# Patient Record
Sex: FEMALE | Race: WHITE
[De-identification: names, ages, dates, MRNs, and addresses within clinical notes are randomized per-mention and may not be internally consistent; named-entity substitution may affect disease eponyms.]

---

## 2018-04-25 ENCOUNTER — HOSPITAL ENCOUNTER (INPATIENT)
Dept: HOSPITAL 63 - GEROPSY | Age: 63
LOS: 15 days | Discharge: NURSING, REHAB OR LONG-TERM CARE | DRG: 885 | End: 2018-05-10
Attending: PSYCHIATRY & NEUROLOGY | Admitting: PSYCHIATRY & NEUROLOGY
Payer: MEDICAID

## 2018-04-25 VITALS — DIASTOLIC BLOOD PRESSURE: 74 MMHG | SYSTOLIC BLOOD PRESSURE: 107 MMHG

## 2018-04-25 VITALS — BODY MASS INDEX: 31.18 KG/M2 | HEIGHT: 69 IN | WEIGHT: 210.5 LBS

## 2018-04-25 VITALS — DIASTOLIC BLOOD PRESSURE: 84 MMHG | SYSTOLIC BLOOD PRESSURE: 154 MMHG

## 2018-04-25 DIAGNOSIS — F25.0: Primary | ICD-10-CM

## 2018-04-25 DIAGNOSIS — I10: ICD-10-CM

## 2018-04-25 DIAGNOSIS — Z88.0: ICD-10-CM

## 2018-04-25 DIAGNOSIS — F41.9: ICD-10-CM

## 2018-04-25 DIAGNOSIS — Z91.14: ICD-10-CM

## 2018-04-25 DIAGNOSIS — Z79.899: ICD-10-CM

## 2018-04-25 DIAGNOSIS — H40.9: ICD-10-CM

## 2018-04-25 DIAGNOSIS — Z91.83: ICD-10-CM

## 2018-04-25 DIAGNOSIS — E78.5: ICD-10-CM

## 2018-04-25 DIAGNOSIS — E78.1: ICD-10-CM

## 2018-04-25 DIAGNOSIS — G47.00: ICD-10-CM

## 2018-04-25 DIAGNOSIS — F09: ICD-10-CM

## 2018-04-25 DIAGNOSIS — Z88.1: ICD-10-CM

## 2018-04-25 DIAGNOSIS — F01.50: ICD-10-CM

## 2018-04-25 DIAGNOSIS — G30.9: ICD-10-CM

## 2018-04-25 DIAGNOSIS — E11.9: ICD-10-CM

## 2018-04-25 DIAGNOSIS — F02.81: ICD-10-CM

## 2018-04-25 DIAGNOSIS — F63.9: ICD-10-CM

## 2018-04-25 DIAGNOSIS — M19.90: ICD-10-CM

## 2018-04-25 LAB
ALBUMIN SERPL-MCNC: 3.7 G/DL (ref 3.4–5)
ALBUMIN/GLOB SERPL: 0.9 {RATIO} (ref 1–1.7)
ALP SERPL-CCNC: 66 U/L (ref 46–116)
ALT SERPL-CCNC: 21 U/L (ref 14–59)
ANION GAP SERPL CALC-SCNC: 10 MMOL/L (ref 6–14)
AST SERPL-CCNC: 12 U/L (ref 15–37)
BASOPHILS # BLD AUTO: 0.1 X10^3/UL (ref 0–0.2)
BASOPHILS NFR BLD: 1 % (ref 0–3)
BILIRUB SERPL-MCNC: 0.3 MG/DL (ref 0.2–1)
BUN/CREAT SERPL: 20 (ref 6–20)
CA-I SERPL ISE-MCNC: 16 MG/DL (ref 7–20)
CALCIUM SERPL-MCNC: 8.9 MG/DL (ref 8.5–10.1)
CHLORIDE SERPL-SCNC: 107 MMOL/L (ref 98–107)
CO2 SERPL-SCNC: 26 MMOL/L (ref 21–32)
CREAT SERPL-MCNC: 0.8 MG/DL (ref 0.6–1)
EOSINOPHIL NFR BLD: 0.1 X10^3/UL (ref 0–0.7)
EOSINOPHIL NFR BLD: 1 % (ref 0–3)
ERYTHROCYTE [DISTWIDTH] IN BLOOD BY AUTOMATED COUNT: 15 % (ref 11.5–14.5)
GFR SERPLBLD BASED ON 1.73 SQ M-ARVRAT: 72.4 ML/MIN
GLOBULIN SER-MCNC: 3.9 G/DL (ref 2.2–3.8)
GLUCOSE SERPL-MCNC: 97 MG/DL (ref 70–99)
HCT VFR BLD CALC: 40.1 % (ref 36–47)
HGB BLD-MCNC: 13.3 G/DL (ref 12–15.5)
LYMPHOCYTES # BLD: 2.2 X10^3/UL (ref 1–4.8)
LYMPHOCYTES NFR BLD AUTO: 30 % (ref 24–48)
MAGNESIUM SERPL-MCNC: 1.8 MG/DL (ref 1.8–2.4)
MCH RBC QN AUTO: 28 PG (ref 25–35)
MCHC RBC AUTO-ENTMCNC: 33 G/DL (ref 31–37)
MCV RBC AUTO: 84 FL (ref 79–100)
MONO #: 0.8 X10^3/UL (ref 0–1.1)
MONOCYTES NFR BLD: 12 % (ref 0–9)
NEUT #: 4 X10^3UL (ref 1.8–7.7)
NEUTROPHILS NFR BLD AUTO: 56 % (ref 31–73)
PLATELET # BLD AUTO: 270 X10^3/UL (ref 140–400)
POTASSIUM SERPL-SCNC: 4 MMOL/L (ref 3.5–5.1)
PROT SERPL-MCNC: 7.6 G/DL (ref 6.4–8.2)
RBC # BLD AUTO: 4.77 X10^6/UL (ref 3.5–5.4)
SODIUM SERPL-SCNC: 143 MMOL/L (ref 136–145)
WBC # BLD AUTO: 7.2 X10^3/UL (ref 4–11)

## 2018-04-25 PROCEDURE — 85025 COMPLETE CBC W/AUTO DIFF WBC: CPT

## 2018-04-25 PROCEDURE — 82306 VITAMIN D 25 HYDROXY: CPT

## 2018-04-25 PROCEDURE — 86593 SYPHILIS TEST NON-TREP QUANT: CPT

## 2018-04-25 PROCEDURE — 83036 HEMOGLOBIN GLYCOSYLATED A1C: CPT

## 2018-04-25 PROCEDURE — 83540 ASSAY OF IRON: CPT

## 2018-04-25 PROCEDURE — 80061 LIPID PANEL: CPT

## 2018-04-25 PROCEDURE — 83735 ASSAY OF MAGNESIUM: CPT

## 2018-04-25 PROCEDURE — 82947 ASSAY GLUCOSE BLOOD QUANT: CPT

## 2018-04-25 PROCEDURE — 82607 VITAMIN B-12: CPT

## 2018-04-25 PROCEDURE — 84443 ASSAY THYROID STIM HORMONE: CPT

## 2018-04-25 PROCEDURE — 81001 URINALYSIS AUTO W/SCOPE: CPT

## 2018-04-25 PROCEDURE — 84480 ASSAY TRIIODOTHYRONINE (T3): CPT

## 2018-04-25 PROCEDURE — 36415 COLL VENOUS BLD VENIPUNCTURE: CPT

## 2018-04-25 PROCEDURE — 83550 IRON BINDING TEST: CPT

## 2018-04-25 PROCEDURE — 87086 URINE CULTURE/COLONY COUNT: CPT

## 2018-04-25 PROCEDURE — 84436 ASSAY OF TOTAL THYROXINE: CPT

## 2018-04-25 PROCEDURE — 80053 COMPREHEN METABOLIC PANEL: CPT

## 2018-04-25 PROCEDURE — 80164 ASSAY DIPROPYLACETIC ACD TOT: CPT

## 2018-04-25 RX ADMIN — TIMOLOL MALEATE SCH DROP: 5 SOLUTION/ DROPS OPHTHALMIC at 09:00

## 2018-04-25 RX ADMIN — Medication SCH MG: at 10:50

## 2018-04-25 RX ADMIN — POTASSIUM CHLORIDE SCH MEQ: 750 TABLET, EXTENDED RELEASE ORAL at 10:50

## 2018-04-25 RX ADMIN — HYDROCODONE BITARTRATE AND ACETAMINOPHEN PRN TAB: 5; 325 TABLET ORAL at 22:57

## 2018-04-25 RX ADMIN — LINAGLIPTIN SCH MG: 5 TABLET, FILM COATED ORAL at 06:55

## 2018-04-25 RX ADMIN — POTASSIUM CHLORIDE SCH MEQ: 750 TABLET, EXTENDED RELEASE ORAL at 10:54

## 2018-04-25 RX ADMIN — POTASSIUM CHLORIDE SCH MEQ: 750 TABLET, EXTENDED RELEASE ORAL at 17:00

## 2018-04-25 RX ADMIN — GABAPENTIN SCH MG: 300 CAPSULE ORAL at 10:53

## 2018-04-25 RX ADMIN — LINAGLIPTIN SCH MG: 5 TABLET, FILM COATED ORAL at 06:00

## 2018-04-25 RX ADMIN — OXYBUTYNIN CHLORIDE SCH MG: 5 TABLET ORAL at 10:50

## 2018-04-25 RX ADMIN — OXYBUTYNIN CHLORIDE SCH MG: 5 TABLET ORAL at 21:00

## 2018-04-25 RX ADMIN — Medication SCH MG: at 10:53

## 2018-04-25 RX ADMIN — Medication SCH MG: at 18:00

## 2018-04-25 RX ADMIN — GABAPENTIN SCH MG: 300 CAPSULE ORAL at 10:50

## 2018-04-25 RX ADMIN — BENZTROPINE MESYLATE SCH MG: 1 TABLET ORAL at 11:28

## 2018-04-25 RX ADMIN — ATORVASTATIN CALCIUM SCH MG: 10 TABLET, FILM COATED ORAL at 21:00

## 2018-04-25 RX ADMIN — BENZTROPINE MESYLATE SCH MG: 1 TABLET ORAL at 10:53

## 2018-04-25 RX ADMIN — OXYBUTYNIN CHLORIDE SCH MG: 5 TABLET ORAL at 10:53
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
## 2018-04-25 NOTE — PDOC
Exam


Note:


Javier Note:


Please also refer to the separate dictated note~for this date of service 

dictated separately.~Patient seen individually. Discussed the patient with 

Nursing staff reviewed the chart.~Reviewed interim history and current 

functioning. Reviewed vital signs,~Labs/ Radiology~and current medications 

noted below. Continue current treatment with the changes noted in the dictated 

addendum note





Assessment:


Vital Signs:





 Vital Signs








  Date Time  Temp Pulse Resp B/P (MAP) Pulse Ox O2 Delivery O2 Flow Rate FiO2


 


4/25/18 15:50 98.2 86 22 107/74 (85) 97   








Labs:





 Laboratory Tests








Test


  4/25/18


15:30 4/25/18


16:38


 


White Blood Count


  7.2 x10^3/uL


(4.0-11.0) 


 


 


Red Blood Count


  4.77 x10^6/uL


(3.50-5.40) 


 


 


Hemoglobin


  13.3 g/dL


(12.0-15.5) 


 


 


Hematocrit


  40.1 %


(36.0-47.0) 


 


 


Mean Corpuscular Volume


  84 fL ()


  


 


 


Mean Corpuscular Hemoglobin 28 pg (25-35)   


 


Mean Corpuscular Hemoglobin


Concent 33 g/dL


(31-37) 


 


 


Red Cell Distribution Width


  15.0 %


(11.5-14.5)  H 


 


 


Platelet Count


  270 x10^3/uL


(140-400) 


 


 


Neutrophils (%) (Auto) 56 % (31-73)   


 


Lymphocytes (%) (Auto) 30 % (24-48)   


 


Monocytes (%) (Auto) 12 % (0-9)  H 


 


Eosinophils (%) (Auto) 1 % (0-3)   


 


Basophils (%) (Auto) 1 % (0-3)   


 


Neutrophils # (Auto)


  4.0 x10^3uL


(1.8-7.7) 


 


 


Lymphocytes # (Auto)


  2.2 x10^3/uL


(1.0-4.8) 


 


 


Monocytes # (Auto)


  0.8 x10^3/uL


(0.0-1.1) 


 


 


Eosinophils # (Auto)


  0.1 x10^3/uL


(0.0-0.7) 


 


 


Basophils # (Auto)


  0.1 x10^3/uL


(0.0-0.2) 


 


 


Sodium Level


  143 mmol/L


(136-145) 


 


 


Potassium Level


  4.0 mmol/L


(3.5-5.1) 


 


 


Chloride Level


  107 mmol/L


() 


 


 


Carbon Dioxide Level


  26 mmol/L


(21-32) 


 


 


Anion Gap 10 (6-14)   


 


Blood Urea Nitrogen


  16 mg/dL


(7-20) 


 


 


Creatinine


  0.8 mg/dL


(0.6-1.0) 


 


 


Estimated GFR


(Cockcroft-Gault) 72.4  


  


 


 


BUN/Creatinine Ratio 20 (6-20)   


 


Glucose Level


  97 mg/dL


(70-99) 


 


 


Calcium Level


  8.9 mg/dL


(8.5-10.1) 


 


 


Magnesium Level


  1.8 mg/dL


(1.8-2.4) 


 


 


Total Bilirubin


  0.3 mg/dL


(0.2-1.0) 


 


 


Aspartate Amino Transferase


(AST) 12 U/L (15-37)


L 


 


 


Alanine Aminotransferase (ALT)


  21 U/L (14-59)


  


 


 


Alkaline Phosphatase


  66 U/L


() 


 


 


Total Protein


  7.6 g/dL


(6.4-8.2) 


 


 


Albumin


  3.7 g/dL


(3.4-5.0) 


 


 


Albumin/Globulin Ratio


  0.9 (1.0-1.7)


L 


 


 


Glucose (Fingerstick)


  


  105 mg/dL


(70-99)  H











Current Medications:


Meds:





Current Medications


Acetaminophen (Tylenol) 650 mg PRN Q6HRS  PRN PO PAIN / TEMP;  Start 4/25/18 at 

05:00


Multi-Ingredient Ointment (Analgesic Balm) 1 edin PRN QID  PRN TP MUSCLE PAIN;  

Start 4/25/18 at 05:00


Al Hydroxide/Mg Hydroxide (Mylanta Plus Xs) 15 ml PRN AFTMEALHC  PRN PO 

DYSPEPSIA;  Start 4/25/18 at 05:00


Magnesium Hydroxide (Milk Of Magnesia) 2,400 mg PRN QHS  PRN PO CONSTIPATION;  

Start 4/25/18 at 05:00


Acetaminophen (Tylenol) 650 mg PRN Q4HRS  PRN PO PAIN / TEMP;  Start 4/25/18 at 

05:30


Amlodipine Besylate (Norvasc) 5 mg DAILY PO ;  Start 4/25/18 at 09:00


Atorvastatin Calcium (Lipitor) 10 mg QHS PO ;  Start 4/25/18 at 21:00


Bismuth Subsalicylate (Pepto-Bismol) 262 mg PRN Q4HRS  PRN PO DYSPEPSIA;  Start 

4/25/18 at 05:30


Ferrous Sulfate (Feosol) 325 mg BIDAFTMEAL PO ;  Start 4/25/18 at 09:00


Gabapentin (Neurontin) 300 mg DAILY PO ;  Start 4/25/18 at 09:00


Acetaminophen/ Hydrocodone Bitart (Lortab 5/325) 1 tab TID  PRN PO PAIN;  Start 

4/25/18 at 05:30


Al Hydroxide/Mg Hydroxide (Mylanta Plus Xs) 30 ml PRN Q4HRS  PRN PO DYSPEPSIA;  

Start 4/25/18 at 05:30


Timolol Maleate (Timoptic 0.5% Ophth) 1 drop DAILY OU ;  Start 4/25/18 at 09:00


Vitamin D (Vitamin D3) 50,000 unit WEEKLY PO ;  Start 4/27/18 at 09:00


Hydrochlorothiazide (Hydrodiuril) 25 mg DAILY PO ;  Start 4/25/18 at 09:00


Loperamide HCl (Imodium) 2 mg PRN Q2HR  PRN PO DIARRHEA;  Start 4/25/18 at 05:30


Magnesium Hydroxide (Milk Of Magnesia) 2,400 mg PRN DAILY  PRN PO CONSTIPATION;

  Start 4/25/18 at 05:30


Metformin HCl (Glucophage) 1,000 mg BIDWMEALS PO ;  Start 4/25/18 at 08:00


Potassium Chloride (Klor-Con) 10 meq BIDWMEALS PO ;  Start 4/25/18 at 08:00


Linagliptin (Tradjenta) 5 mg DAILY06 PO ;  Start 4/25/18 at 06:00


Non-Formulary Medication (Solifenacin Succinate (Vesicare)) 5 mg DAILY PO ;  

Start 4/25/18 at 09:00;  Stop 4/25/18 at 09:00;  Status DC


Oxybutynin Chloride (Ditropan) 5 mg BID PO ;  Start 4/25/18 at 09:00


Benztropine Mesylate (Cogentin) 1 mg DAILY PO ;  Start 4/25/18 at 11:00


Olanzapine (ZyPREXA ZYDIS) 2.5 mg PRN Q2HR  PRN PO PSYCHOSIS Last administered 

on 4/25/18at 11:04;  Start 4/25/18 at 10:45;  Stop 4/25/18 at 11:26;  Status DC


Olanzapine (ZyPREXA ZYDIS) 5 mg PRN Q2HR  PRN PO PSYCHOSIS Last administered on 

4/25/18at 15:30;  Start 4/25/18 at 11:30


Lorazepam (Ativan) 0.5 mg PRN Q2HR  PRN PO ANXIETY / AGITATION Last 

administered on 4/25/18at 16:56;  Start 4/25/18 at 11:30


Valproic Acid (Depakene) 500 mg QHS PO ;  Start 4/25/18 at 21:00





Active Scripts


Active


Reported


Benztropine Mesylate 1 Mg Tablet 1 Mg PO DAILY


Atorvastatin Calcium 10 Mg Tablet 10 Mg PO QHS


Amlodipine Besylate 5 Mg Tablet 5 Mg PO DAILY


Januvia (Sitagliptin Phosphate) 100 Mg Tablet 100 Mg PO DAILY06


Potassium Chloride 10 Meq Tablet.er 10 Meq PO BID


Metformin Hcl 1,000 Mg Tablet 1,000 Mg PO BID


Hydrocodone-Apap 5-325  ** (Hydrocodone Bit/Acetaminophen) 1 Each Tablet 1 Tab 

PO TID PRN


Hydrochlorothiazide Tablet (Hydrochlorothiazide) 12.5 Mg Tablet 25 Mg PO DAILY


Gabapentin 300 Mg Capsule 300 Mg PO DAILY


Ferrous Sulfate 325 Mg Tablet 325 Mg PO BID


Vitamin D2 (Ergocalciferol (Vitamin D2)) 50,000 Unit Capsule 50,000 Unit PO QFR


Vesicare (Solifenacin Succinate) 5 Mg Tablet 5 Mg PO DAILY


Timoptic (Timolol Maleate) 10 Ml Drops 1 Ml OU DAILY


Milk Of Magnesia (Magnesium Hydroxide) 2,400 Mg/10 Ml Oral.susp 2,400 Mg PO PRN 

DAILY PRN


Maalox Maximum Strength Susp (Mag Hydrox/Al Hydrox/Simeth) 355 Ml Oral.susp 30 

Ml PO PRN Q4HRS PRN


Loperamide (Loperamide Hcl) 2 Mg Tablet 2 Mg PO PRN Q2HR PRN


Bismatrol (Bismuth Subsalicylate) 262 Mg/15 Ml Oral.susp 262 Mg PO PRN Q4HRS PRN


Tylenol (Acetaminophen) 325 Mg Tablet 650 Mg PO PRN Q4HRS PRN


Zyprexa Zydis (Olanzapine) 5 Mg Tab.rapdis 2.5 Mg PO PRN Q2HR PRN


Haldol Decanoate 100 (Haloperidol Decanoate) 100 Mg/1 Ml Ampul 50 Mg IM [id number]


Invega Sustenna (Paliperidone Palmitate) 234 Mg/1.5 Ml Disp.syrin 234 Mg IM 

QMONTH


I have reviewed the current psychotropics carefully including drug 

interactions.  Risk benefit ratio favors no change other than as noted in my 

dictated progress note.





Diagnosis:


Problems:  


(1) Anxiety disorder


(2) Bipolar affective, mixed, sev w/ psych


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


(5) Dementia, vascular, with delusions


(6) Impulse control disorder


(7) Schizoaffective disorder, chronic condition with acute exacerbation


(8) Schizoaffective disorder











MARYAM HOYT MD Apr 25, 2018 20:29

## 2018-04-26 VITALS — DIASTOLIC BLOOD PRESSURE: 87 MMHG | SYSTOLIC BLOOD PRESSURE: 155 MMHG

## 2018-04-26 VITALS — DIASTOLIC BLOOD PRESSURE: 57 MMHG | SYSTOLIC BLOOD PRESSURE: 87 MMHG

## 2018-04-26 LAB
APTT PPP: YELLOW S
BACTERIA #/AREA URNS HPF: (no result) /HPF
BILIRUB UR QL STRIP: (no result)
CHOLEST SERPL-MCNC: 167 MG/DL (ref 0–200)
CHOLEST/HDLC SERPL: 4 {RATIO}
FIBRINOGEN PPP-MCNC: (no result) MG/DL
GLUCOSE UR STRIP-MCNC: (no result) MG/DL
HBA1C MFR BLD: 6.3 % (ref 4.8–5.6)
HDLC SERPL-MCNC: 38 MG/DL (ref 40–60)
HYALINE CASTS #/AREA URNS LPF: (no result) /HPF
LDLC: 98 MG/DL (ref 0–100)
NITRITE UR QL STRIP: (no result)
RBC #/AREA URNS HPF: 0 /HPF (ref 0–2)
SP GR UR STRIP: 1.02
SQUAMOUS #/AREA URNS LPF: (no result) /LPF
T3 SERPL-MCNC: 90 NG/DL (ref 71–180)
T4 SERPL-MCNC: 8 UG/DL (ref 4.5–12)
THYROID STIM HORMONE (TSH): 1.21 UIU/ML (ref 0.36–3.74)
TRIGL SERPL-MCNC: 158 MG/DL (ref 0–150)
UROBILINOGEN UR-MCNC: 0.2 MG/DL
VLDLC: 31 MG/DL (ref 0–40)
WBC #/AREA URNS HPF: >40 /HPF (ref 0–4)

## 2018-04-26 RX ADMIN — GABAPENTIN SCH MG: 300 CAPSULE ORAL at 10:56

## 2018-04-26 RX ADMIN — BENZTROPINE MESYLATE SCH MG: 1 TABLET ORAL at 10:56

## 2018-04-26 RX ADMIN — HALOPERIDOL LACTATE SCH MG: 5 INJECTION, SOLUTION INTRAMUSCULAR at 03:30

## 2018-04-26 RX ADMIN — POTASSIUM CHLORIDE SCH MEQ: 750 TABLET, EXTENDED RELEASE ORAL at 10:56

## 2018-04-26 RX ADMIN — VALPROIC ACID SCH MG: 250 SOLUTION ORAL at 21:25

## 2018-04-26 RX ADMIN — OXYBUTYNIN CHLORIDE SCH MG: 5 TABLET ORAL at 21:00

## 2018-04-26 RX ADMIN — Medication SCH MG: at 17:20

## 2018-04-26 RX ADMIN — Medication SCH MG: at 10:56

## 2018-04-26 RX ADMIN — ATORVASTATIN CALCIUM SCH MG: 10 TABLET, FILM COATED ORAL at 19:41

## 2018-04-26 RX ADMIN — POTASSIUM CHLORIDE SCH MEQ: 750 TABLET, EXTENDED RELEASE ORAL at 17:20

## 2018-04-26 RX ADMIN — HALOPERIDOL LACTATE SCH MG: 5 INJECTION, SOLUTION INTRAMUSCULAR at 14:44

## 2018-04-26 RX ADMIN — OXYBUTYNIN CHLORIDE SCH MG: 5 TABLET ORAL at 19:41

## 2018-04-26 RX ADMIN — OXYBUTYNIN CHLORIDE SCH MG: 5 TABLET ORAL at 21:39

## 2018-04-26 RX ADMIN — TIMOLOL MALEATE SCH DROP: 5 SOLUTION/ DROPS OPHTHALMIC at 10:58

## 2018-04-26 RX ADMIN — VALPROIC ACID SCH MG: 250 SOLUTION ORAL at 19:42

## 2018-04-26 RX ADMIN — ATORVASTATIN CALCIUM SCH MG: 10 TABLET, FILM COATED ORAL at 21:00

## 2018-04-26 RX ADMIN — LINAGLIPTIN SCH MG: 5 TABLET, FILM COATED ORAL at 05:16

## 2018-04-26 RX ADMIN — OXYBUTYNIN CHLORIDE SCH MG: 5 TABLET ORAL at 10:58

## 2018-04-26 RX ADMIN — ATORVASTATIN CALCIUM SCH MG: 10 TABLET, FILM COATED ORAL at 21:39

## 2018-04-26 NOTE — PDOC
Exam


Note:


Javier Note:


Please also refer to the separate dictated note~for this date of service 

dictated separately.~Patient seen individually. Discussed the patient with 

Nursing staff reviewed the chart.~Reviewed interim history and current 

functioning. Reviewed vital signs,~Labs/ Radiology~and current medications 

noted below. Continue current treatment with the changes noted in the dictated 

addendum note





Assessment:


Vital Signs:





 Vital Signs








  Date Time  Temp Pulse Resp B/P (MAP) Pulse Ox O2 Delivery O2 Flow Rate FiO2


 


4/26/18 16:26 97.1 100 16 87/57 (67) 91   


 


4/25/18 22:57      Room Air  








I&O











Intake and Output 


 


 4/26/18





 07:00


 


Intake Total 1900 ml


 


Balance 1900 ml


 


 


 


Intake Oral 1900 ml


 


# Voids 3


 


# Bowel Movements 1








Labs:





 Laboratory Tests








Test


  4/26/18


07:07 4/26/18


11:30


 


Glucose (Fingerstick)


  147 mg/dL


(70-99)  H 


 


 


Urine Collection Type  Unknown  


 


Urine Color  Yellow  


 


Urine Clarity  Cloudy  


 


Urine pH  5.0  


 


Urine Specific Gravity  1.025  


 


Urine Protein


  


  30 mg/dl


(NEG-TRACE)


 


Urine Glucose (UA)


  


  Neg mg/dL


(NEG)


 


Urine Ketones (Stick)


  


  15 mg/dL (NEG)


 


 


Urine Blood  Neg (NEG)  


 


Urine Nitrite  Neg (NEG)  


 


Urine Bilirubin  Neg (NEG)  


 


Urine Urobilinogen Dipstick


  


  0.2 mg/dL (0.2


mg/dL)


 


Urine Leukocyte Esterase  Small (NEG)  


 


Urine RBC  0 /HPF (0-2)  


 


Urine WBC


  


  >40 /HPF (0-4)


 


 


Urine Squamous Epithelial


Cells 


  Occ /LPF  


 


 


Urine Transitional Epithelial


Cells 


  Occ /LPF  


 


 


Urine Bacteria


  


  Few /HPF


(0-FEW)


 


Urine Hyaline Casts  Few /HPF  


 


Urine Mucus  Mod /LPF  











Current Medications:


Meds:





Current Medications


Acetaminophen (Tylenol) 650 mg PRN Q6HRS  PRN PO PAIN / TEMP;  Start 4/25/18 at 

05:00;  Stop 4/26/18 at 16:16;  Status DC


Multi-Ingredient Ointment (Analgesic Balm) 1 edin PRN QID  PRN TP MUSCLE PAIN;  

Start 4/25/18 at 05:00


Al Hydroxide/Mg Hydroxide (Mylanta Plus Xs) 15 ml PRN AFTMEALHC  PRN PO 

DYSPEPSIA;  Start 4/25/18 at 05:00;  Stop 4/26/18 at 16:16;  Status DC


Magnesium Hydroxide (Milk Of Magnesia) 2,400 mg PRN QHS  PRN PO CONSTIPATION;  

Start 4/25/18 at 05:00;  Stop 4/26/18 at 16:16;  Status DC


Acetaminophen (Tylenol) 650 mg PRN Q4HRS  PRN PO PAIN / TEMP;  Start 4/25/18 at 

05:30


Amlodipine Besylate (Norvasc) 5 mg DAILY PO  Last administered on 4/26/18at 10:

56;  Start 4/25/18 at 09:00


Atorvastatin Calcium (Lipitor) 10 mg QHS PO  Last administered on 4/26/18at 19:

41;  Start 4/25/18 at 21:00


Bismuth Subsalicylate (Pepto-Bismol) 262 mg PRN Q4HRS  PRN PO DYSPEPSIA;  Start 

4/25/18 at 05:30


Ferrous Sulfate (Feosol) 325 mg BIDAFTMEAL PO  Last administered on 4/26/18at 17

:20;  Start 4/25/18 at 09:00


Gabapentin (Neurontin) 300 mg DAILY PO  Last administered on 4/26/18at 10:56;  

Start 4/25/18 at 09:00


Acetaminophen/ Hydrocodone Bitart (Lortab 5/325) 1 tab TID  PRN PO PAIN Last 

administered on 4/25/18at 22:57;  Start 4/25/18 at 05:30


Al Hydroxide/Mg Hydroxide (Mylanta Plus Xs) 30 ml PRN Q4HRS  PRN PO DYSPEPSIA;  

Start 4/25/18 at 05:30


Timolol Maleate (Timoptic 0.5% Ophth) 1 drop DAILY OU  Last administered on 4/26 /18at 10:58;  Start 4/25/18 at 09:00


Vitamin D (Vitamin D3) 50,000 unit WEEKLY PO ;  Start 4/27/18 at 09:00


Hydrochlorothiazide (Hydrodiuril) 25 mg DAILY PO  Last administered on 4/26/ 18at 10:57;  Start 4/25/18 at 09:00


Loperamide HCl (Imodium) 2 mg PRN Q2HR  PRN PO DIARRHEA;  Start 4/25/18 at 05:30


Magnesium Hydroxide (Milk Of Magnesia) 2,400 mg PRN DAILY  PRN PO CONSTIPATION;

  Start 4/25/18 at 05:30


Metformin HCl (Glucophage) 1,000 mg BIDWMEALS PO  Last administered on 4/26/ 18at 17:20;  Start 4/25/18 at 08:00


Potassium Chloride (Klor-Con) 10 meq BIDWMEALS PO  Last administered on 4/26/ 18at 17:20;  Start 4/25/18 at 08:00


Linagliptin (Tradjenta) 5 mg DAILY06 PO  Last administered on 4/26/18at 05:16;  

Start 4/25/18 at 06:00


Non-Formulary Medication (Solifenacin Succinate (Vesicare)) 5 mg DAILY PO ;  

Start 4/25/18 at 09:00;  Stop 4/25/18 at 09:00;  Status DC


Oxybutynin Chloride (Ditropan) 5 mg BID PO  Last administered on 4/26/18at 19:41

;  Start 4/25/18 at 09:00


Benztropine Mesylate (Cogentin) 1 mg DAILY PO  Last administered on 4/26/18at 10

:56;  Start 4/25/18 at 11:00


Olanzapine (ZyPREXA ZYDIS) 2.5 mg PRN Q2HR  PRN PO PSYCHOSIS Last administered 

on 4/25/18at 11:04;  Start 4/25/18 at 10:45;  Stop 4/25/18 at 11:26;  Status DC


Olanzapine (ZyPREXA ZYDIS) 5 mg PRN Q2HR  PRN PO PSYCHOSIS Last administered on 

4/26/18at 20:14;  Start 4/25/18 at 11:30


Lorazepam (Ativan) 0.5 mg PRN Q2HR  PRN PO ANXIETY / AGITATION Last 

administered on 4/26/18at 20:14;  Start 4/25/18 at 11:30


Valproic Acid (Depakene) 500 mg QHS PO ;  Start 4/25/18 at 21:00;  Stop 4/26/18 

at 18:46;  Status DC


Haloperidol Lactate (Haldol) 5 mg DAILY IM  Last administered on 4/26/18at 14:44

;  Start 4/26/18 at 03:15


Lorazepam (Ativan) 1 mg DAILY IM  Last administered on 4/26/18at 14:44;  Start 4 /26/18 at 03:15


Valproic Acid (Depakene) 500 mg QHS PO  Last administered on 4/26/18at 19:42;  

Start 4/26/18 at 21:00





Active Scripts


Active


Reported


Benztropine Mesylate 1 Mg Tablet 1 Mg PO DAILY


Atorvastatin Calcium 10 Mg Tablet 10 Mg PO QHS


Amlodipine Besylate 5 Mg Tablet 5 Mg PO DAILY


Januvia (Sitagliptin Phosphate) 100 Mg Tablet 100 Mg PO DAILY06


Potassium Chloride 10 Meq Tablet.er 10 Meq PO BID


Metformin Hcl 1,000 Mg Tablet 1,000 Mg PO BID


Hydrocodone-Apap 5-325  ** (Hydrocodone Bit/Acetaminophen) 1 Each Tablet 1 Tab 

PO TID PRN


Hydrochlorothiazide Tablet (Hydrochlorothiazide) 12.5 Mg Tablet 25 Mg PO DAILY


Gabapentin 300 Mg Capsule 300 Mg PO DAILY


Ferrous Sulfate 325 Mg Tablet 325 Mg PO BID


Vitamin D2 (Ergocalciferol (Vitamin D2)) 50,000 Unit Capsule 50,000 Unit PO QFR


Vesicare (Solifenacin Succinate) 5 Mg Tablet 5 Mg PO DAILY


Timoptic (Timolol Maleate) 10 Ml Drops 1 Ml OU DAILY


Milk Of Magnesia (Magnesium Hydroxide) 2,400 Mg/10 Ml Oral.susp 2,400 Mg PO PRN 

DAILY PRN


Maalox Maximum Strength Susp (Mag Hydrox/Al Hydrox/Simeth) 355 Ml Oral.susp 30 

Ml PO PRN Q4HRS PRN


Loperamide (Loperamide Hcl) 2 Mg Tablet 2 Mg PO PRN Q2HR PRN


Bismatrol (Bismuth Subsalicylate) 262 Mg/15 Ml Oral.susp 262 Mg PO PRN Q4HRS PRN


Tylenol (Acetaminophen) 325 Mg Tablet 650 Mg PO PRN Q4HRS PRN


Zyprexa Zydis (Olanzapine) 5 Mg Tab.rapdis 2.5 Mg PO PRN Q2HR PRN


Haldol Decanoate 100 (Haloperidol Decanoate) 100 Mg/1 Ml Ampul 50 Mg IM [id number]


Invega Sustenna (Paliperidone Palmitate) 234 Mg/1.5 Ml Disp.syrin 234 Mg IM 

QMONTH


I have reviewed the current psychotropics carefully including drug 

interactions.  Risk benefit ratio favors no change other than as noted in my 

dictated progress note.





Diagnosis:


Problems:  


(1) Anxiety disorder


(2) Bipolar affective, mixed, sev w/ psych


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


(5) Dementia, vascular, with delusions


(6) Impulse control disorder


(7) Schizoaffective disorder, chronic condition with acute exacerbation


(8) Schizoaffective disorder











MARYAM HOYT MD Apr 26, 2018 21:13

## 2018-04-27 VITALS — SYSTOLIC BLOOD PRESSURE: 150 MMHG | DIASTOLIC BLOOD PRESSURE: 58 MMHG

## 2018-04-27 RX ADMIN — GABAPENTIN SCH MG: 300 CAPSULE ORAL at 10:12

## 2018-04-27 RX ADMIN — VALPROIC ACID SCH MG: 250 SOLUTION ORAL at 19:15

## 2018-04-27 RX ADMIN — TRAZODONE HYDROCHLORIDE SCH MG: 100 TABLET ORAL at 20:36

## 2018-04-27 RX ADMIN — CHOLECALCIFEROL CAP 1.25 MG (50000 UNIT) SCH UNIT: 1.25 CAP at 18:14

## 2018-04-27 RX ADMIN — HALOPERIDOL LACTATE SCH MG: 5 INJECTION, SOLUTION INTRAMUSCULAR at 19:42

## 2018-04-27 RX ADMIN — Medication SCH MG: at 10:13

## 2018-04-27 RX ADMIN — Medication SCH MG: at 18:15

## 2018-04-27 RX ADMIN — TIMOLOL MALEATE SCH DROP: 5 SOLUTION/ DROPS OPHTHALMIC at 09:00

## 2018-04-27 RX ADMIN — LINAGLIPTIN SCH MG: 5 TABLET, FILM COATED ORAL at 06:12

## 2018-04-27 RX ADMIN — ATORVASTATIN CALCIUM SCH MG: 10 TABLET, FILM COATED ORAL at 19:15

## 2018-04-27 RX ADMIN — HALOPERIDOL LACTATE SCH MG: 5 INJECTION, SOLUTION INTRAMUSCULAR at 18:00

## 2018-04-27 RX ADMIN — BENZTROPINE MESYLATE SCH MG: 1 TABLET ORAL at 10:13

## 2018-04-27 RX ADMIN — POTASSIUM CHLORIDE SCH MEQ: 750 TABLET, EXTENDED RELEASE ORAL at 10:13

## 2018-04-27 RX ADMIN — OXYBUTYNIN CHLORIDE SCH MG: 5 TABLET ORAL at 19:15

## 2018-04-27 RX ADMIN — OXYBUTYNIN CHLORIDE SCH MG: 5 TABLET ORAL at 10:13

## 2018-04-27 RX ADMIN — POTASSIUM CHLORIDE SCH MEQ: 750 TABLET, EXTENDED RELEASE ORAL at 18:14

## 2018-04-27 NOTE — PDOC
Exam


Note:


Javier Note:


Please also refer to the separate dictated note~for this date of service 

dictated separately.~Patient seen individually. Discussed the patient with 

Nursing staff reviewed the chart.~Reviewed interim history and current 

functioning. Reviewed vital signs,~Labs/ Radiology~and current medications 

noted below. Continue current treatment with the changes noted in the dictated 

addendum note





Assessment:


Vital Signs:





 Vital Signs








  Date Time  Temp Pulse Resp B/P (MAP) Pulse Ox O2 Delivery O2 Flow Rate FiO2


 


4/27/18 16:29 97.8 106 18 150/58 (88) 91   


 


4/25/18 22:57      Room Air  








I&O











Intake and Output 


 


 4/27/18





 07:00


 


Intake Total 720 ml


 


Balance 720 ml


 


 


 


Intake Oral 720 ml


 


# Bowel Movements 1








Labs:





 Laboratory Tests








Test


  4/27/18


07:46


 


Glucose (Fingerstick)


  125 mg/dL


(70-99)  H











Current Medications:


Meds:





Current Medications


Acetaminophen (Tylenol) 650 mg PRN Q6HRS  PRN PO PAIN / TEMP;  Start 4/25/18 at 

05:00;  Stop 4/26/18 at 16:16;  Status DC


Multi-Ingredient Ointment (Analgesic Balm) 1 edin PRN QID  PRN TP MUSCLE PAIN;  

Start 4/25/18 at 05:00


Al Hydroxide/Mg Hydroxide (Mylanta Plus Xs) 15 ml PRN AFTMEALHC  PRN PO 

DYSPEPSIA;  Start 4/25/18 at 05:00;  Stop 4/26/18 at 16:16;  Status DC


Magnesium Hydroxide (Milk Of Magnesia) 2,400 mg PRN QHS  PRN PO CONSTIPATION;  

Start 4/25/18 at 05:00;  Stop 4/26/18 at 16:16;  Status DC


Acetaminophen (Tylenol) 650 mg PRN Q4HRS  PRN PO PAIN / TEMP;  Start 4/25/18 at 

05:30


Amlodipine Besylate (Norvasc) 5 mg DAILY PO  Last administered on 4/26/18at 10:

56;  Start 4/25/18 at 09:00


Atorvastatin Calcium (Lipitor) 10 mg QHS PO  Last administered on 4/27/18at 19:

15;  Start 4/25/18 at 21:00


Bismuth Subsalicylate (Pepto-Bismol) 262 mg PRN Q4HRS  PRN PO DYSPEPSIA;  Start 

4/25/18 at 05:30


Ferrous Sulfate (Feosol) 325 mg BIDAFTMEAL PO  Last administered on 4/27/18at 18

:15;  Start 4/25/18 at 09:00


Gabapentin (Neurontin) 300 mg DAILY PO  Last administered on 4/27/18at 10:12;  

Start 4/25/18 at 09:00


Acetaminophen/ Hydrocodone Bitart (Lortab 5/325) 1 tab TID  PRN PO PAIN Last 

administered on 4/25/18at 22:57;  Start 4/25/18 at 05:30


Al Hydroxide/Mg Hydroxide (Mylanta Plus Xs) 30 ml PRN Q4HRS  PRN PO DYSPEPSIA;  

Start 4/25/18 at 05:30


Timolol Maleate (Timoptic 0.5% Ophth) 1 drop DAILY OU  Last administered on 4/26 /18at 10:58;  Start 4/25/18 at 09:00


Vitamin D (Vitamin D3) 50,000 unit WEEKLY PO  Last administered on 4/27/18at 18:

14;  Start 4/27/18 at 09:00


Hydrochlorothiazide (Hydrodiuril) 25 mg DAILY PO  Last administered on 4/27/ 18at 10:13;  Start 4/25/18 at 09:00


Loperamide HCl (Imodium) 2 mg PRN Q2HR  PRN PO DIARRHEA;  Start 4/25/18 at 05:30


Magnesium Hydroxide (Milk Of Magnesia) 2,400 mg PRN DAILY  PRN PO CONSTIPATION;

  Start 4/25/18 at 05:30


Metformin HCl (Glucophage) 1,000 mg BIDWMEALS PO  Last administered on 4/27/ 18at 18:13;  Start 4/25/18 at 08:00


Potassium Chloride (Klor-Con) 10 meq BIDWMEALS PO  Last administered on 4/27/ 18at 18:14;  Start 4/25/18 at 08:00


Linagliptin (Tradjenta) 5 mg DAILY06 PO  Last administered on 4/27/18at 06:12;  

Start 4/25/18 at 06:00


Non-Formulary Medication (Solifenacin Succinate (Vesicare)) 5 mg DAILY PO ;  

Start 4/25/18 at 09:00;  Stop 4/25/18 at 09:00;  Status DC


Oxybutynin Chloride (Ditropan) 5 mg BID PO  Last administered on 4/27/18at 19:15

;  Start 4/25/18 at 09:00


Benztropine Mesylate (Cogentin) 1 mg DAILY PO  Last administered on 4/27/18at 10

:13;  Start 4/25/18 at 11:00


Olanzapine (ZyPREXA ZYDIS) 2.5 mg PRN Q2HR  PRN PO PSYCHOSIS Last administered 

on 4/25/18at 11:04;  Start 4/25/18 at 10:45;  Stop 4/25/18 at 11:26;  Status DC


Olanzapine (ZyPREXA ZYDIS) 5 mg PRN Q2HR  PRN PO PSYCHOSIS Last administered on 

4/26/18at 20:14;  Start 4/25/18 at 11:30


Lorazepam (Ativan) 0.5 mg PRN Q2HR  PRN PO ANXIETY / AGITATION Last 

administered on 4/26/18at 20:14;  Start 4/25/18 at 11:30


Valproic Acid (Depakene) 500 mg QHS PO ;  Start 4/25/18 at 21:00;  Stop 4/26/18 

at 18:46;  Status DC


Haloperidol Lactate (Haldol) 5 mg DAILY IM  Last administered on 4/27/18at 19:42

;  Start 4/26/18 at 03:15


Lorazepam (Ativan) 1 mg DAILY IM  Last administered on 4/27/18at 21:12;  Start 4 /26/18 at 03:15


Valproic Acid (Depakene) 500 mg QHS PO  Last administered on 4/27/18at 19:15;  

Start 4/26/18 at 21:00


Trazodone HCl (Desyrel) 100 mg QHS PO  Last administered on 4/27/18at 20:36;  

Start 4/27/18 at 21:00


Trazodone HCl (Desyrel) 100 mg PRN QHS  PRN PO INSOMNIA;  Start 4/27/18 at 20:00





Active Scripts


Active


Reported


Benztropine Mesylate 1 Mg Tablet 1 Mg PO DAILY


Atorvastatin Calcium 10 Mg Tablet 10 Mg PO QHS


Amlodipine Besylate 5 Mg Tablet 5 Mg PO DAILY


Januvia (Sitagliptin Phosphate) 100 Mg Tablet 100 Mg PO DAILY06


Potassium Chloride 10 Meq Tablet.er 10 Meq PO BID


Metformin Hcl 1,000 Mg Tablet 1,000 Mg PO BID


Hydrocodone-Apap 5-325  ** (Hydrocodone Bit/Acetaminophen) 1 Each Tablet 1 Tab 

PO TID PRN


Hydrochlorothiazide Tablet (Hydrochlorothiazide) 12.5 Mg Tablet 25 Mg PO DAILY


Gabapentin 300 Mg Capsule 300 Mg PO DAILY


Ferrous Sulfate 325 Mg Tablet 325 Mg PO BID


Vitamin D2 (Ergocalciferol (Vitamin D2)) 50,000 Unit Capsule 50,000 Unit PO QFR


Vesicare (Solifenacin Succinate) 5 Mg Tablet 5 Mg PO DAILY


Timoptic (Timolol Maleate) 10 Ml Drops 1 Ml OU DAILY


Milk Of Magnesia (Magnesium Hydroxide) 2,400 Mg/10 Ml Oral.susp 2,400 Mg PO PRN 

DAILY PRN


Maalox Maximum Strength Susp (Mag Hydrox/Al Hydrox/Simeth) 355 Ml Oral.susp 30 

Ml PO PRN Q4HRS PRN


Loperamide (Loperamide Hcl) 2 Mg Tablet 2 Mg PO PRN Q2HR PRN


Bismatrol (Bismuth Subsalicylate) 262 Mg/15 Ml Oral.susp 262 Mg PO PRN Q4HRS PRN


Tylenol (Acetaminophen) 325 Mg Tablet 650 Mg PO PRN Q4HRS PRN


Zyprexa Zydis (Olanzapine) 5 Mg Tab.rapdis 2.5 Mg PO PRN Q2HR PRN


Haldol Decanoate 100 (Haloperidol Decanoate) 100 Mg/1 Ml Ampul 50 Mg IM [id number]


Invega Sustenna (Paliperidone Palmitate) 234 Mg/1.5 Ml Disp.syrin 234 Mg IM 

QMONTH


I have reviewed the current psychotropics carefully including drug 

interactions.  Risk benefit ratio favors no change other than as noted in my 

dictated progress note.





Diagnosis:


Problems:  


(1) Anxiety disorder


(2) Bipolar affective, mixed, sev w/ psych


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


(5) Dementia, vascular, with delusions


(6) Impulse control disorder


(7) Schizoaffective disorder, chronic condition with acute exacerbation


(8) Schizoaffective disorder











MARYAM HOYT MD Apr 27, 2018 23:00
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
## 2018-04-28 VITALS — DIASTOLIC BLOOD PRESSURE: 69 MMHG | SYSTOLIC BLOOD PRESSURE: 121 MMHG

## 2018-04-28 LAB
ALBUMIN SERPL-MCNC: 3.6 G/DL (ref 3.4–5)
ALBUMIN/GLOB SERPL: 1.1 {RATIO} (ref 1–1.7)
ALP SERPL-CCNC: 74 U/L (ref 46–116)
ALT SERPL-CCNC: 23 U/L (ref 14–59)
ANION GAP SERPL CALC-SCNC: 10 MMOL/L (ref 6–14)
AST SERPL-CCNC: 19 U/L (ref 15–37)
BASOPHILS # BLD AUTO: 0.1 X10^3/UL (ref 0–0.2)
BASOPHILS NFR BLD: 1 % (ref 0–3)
BILIRUB SERPL-MCNC: 0.3 MG/DL (ref 0.2–1)
BUN/CREAT SERPL: 22 (ref 6–20)
CA-I SERPL ISE-MCNC: 20 MG/DL (ref 7–20)
CALCIUM SERPL-MCNC: 9 MG/DL (ref 8.5–10.1)
CHLORIDE SERPL-SCNC: 103 MMOL/L (ref 98–107)
CO2 SERPL-SCNC: 29 MMOL/L (ref 21–32)
CREAT SERPL-MCNC: 0.9 MG/DL (ref 0.6–1)
EOSINOPHIL NFR BLD: 0.1 X10^3/UL (ref 0–0.7)
EOSINOPHIL NFR BLD: 2 % (ref 0–3)
ERYTHROCYTE [DISTWIDTH] IN BLOOD BY AUTOMATED COUNT: 15.2 % (ref 11.5–14.5)
GFR SERPLBLD BASED ON 1.73 SQ M-ARVRAT: 63.2 ML/MIN
GLOBULIN SER-MCNC: 3.4 G/DL (ref 2.2–3.8)
GLUCOSE SERPL-MCNC: 89 MG/DL (ref 70–99)
HCT VFR BLD CALC: 37.2 % (ref 36–47)
HGB BLD-MCNC: 12.3 G/DL (ref 12–15.5)
LYMPHOCYTES # BLD: 3 X10^3/UL (ref 1–4.8)
LYMPHOCYTES NFR BLD AUTO: 33 % (ref 24–48)
MCH RBC QN AUTO: 28 PG (ref 25–35)
MCHC RBC AUTO-ENTMCNC: 33 G/DL (ref 31–37)
MCV RBC AUTO: 84 FL (ref 79–100)
MONO #: 0.9 X10^3/UL (ref 0–1.1)
MONOCYTES NFR BLD: 11 % (ref 0–9)
NEUT #: 4.8 X10^3UL (ref 1.8–7.7)
NEUTROPHILS NFR BLD AUTO: 54 % (ref 31–73)
PLATELET # BLD AUTO: 256 X10^3/UL (ref 140–400)
POTASSIUM SERPL-SCNC: 3.6 MMOL/L (ref 3.5–5.1)
PROT SERPL-MCNC: 7 G/DL (ref 6.4–8.2)
RBC # BLD AUTO: 4.45 X10^6/UL (ref 3.5–5.4)
SODIUM SERPL-SCNC: 142 MMOL/L (ref 136–145)
VAL ACID: 33 MCG/ML (ref 50–100)
WBC # BLD AUTO: 8.9 X10^3/UL (ref 4–11)

## 2018-04-28 RX ADMIN — Medication SCH MG: at 10:10

## 2018-04-28 RX ADMIN — HALOPERIDOL SCH MG: 5 TABLET ORAL at 10:13

## 2018-04-28 RX ADMIN — VALPROIC ACID SCH MG: 250 SOLUTION ORAL at 20:48

## 2018-04-28 RX ADMIN — POTASSIUM CHLORIDE SCH MEQ: 750 TABLET, EXTENDED RELEASE ORAL at 17:00

## 2018-04-28 RX ADMIN — GABAPENTIN SCH MG: 300 CAPSULE ORAL at 10:10

## 2018-04-28 RX ADMIN — VALPROIC ACID SCH MG: 250 SOLUTION ORAL at 16:17

## 2018-04-28 RX ADMIN — ATORVASTATIN CALCIUM SCH MG: 10 TABLET, FILM COATED ORAL at 20:48

## 2018-04-28 RX ADMIN — OXYBUTYNIN CHLORIDE SCH MG: 5 TABLET ORAL at 10:11

## 2018-04-28 RX ADMIN — POTASSIUM CHLORIDE SCH MEQ: 750 TABLET, EXTENDED RELEASE ORAL at 10:11

## 2018-04-28 RX ADMIN — LINAGLIPTIN SCH MG: 5 TABLET, FILM COATED ORAL at 06:44

## 2018-04-28 RX ADMIN — OXYBUTYNIN CHLORIDE SCH MG: 5 TABLET ORAL at 20:48

## 2018-04-28 RX ADMIN — TRAZODONE HYDROCHLORIDE SCH MG: 100 TABLET ORAL at 20:48

## 2018-04-28 RX ADMIN — BENZTROPINE MESYLATE SCH MG: 1 TABLET ORAL at 10:11

## 2018-04-28 RX ADMIN — TIMOLOL MALEATE SCH DROP: 5 SOLUTION/ DROPS OPHTHALMIC at 09:00

## 2018-04-28 RX ADMIN — Medication SCH MG: at 17:39

## 2018-04-28 NOTE — PN
DATE:  04/26/2018



PSYCHIATRIC PROGRESS NOTE



This is a late entry for 04/26/2018, covers elements not covered in my initial

note of 04/26/2018.



SUBJECTIVE:  The patient was staffed at a treatment team meeting in the morning,

seen individually in the evening and I have got a call around 3 o'clock middle

of the night previous night from nursing staff because of the patient's marked

agitation, mood lability, psychosis, yelling, screaming.  She is grabbing other

patients, did not sleep at all at night.  On 04/26/2018, she was yelling after

lunch, extremely psychotic.  At 3 o'clock in the morning, I have started the

patient on scheduled Haldol and Ativan.  This seems to help somewhat given her

noncompliance with oral medications and these were ineffective.  Nursing staff

are making concerted efforts to get her Depakene into her as well for mood

stabilization.



REVIEW OF SYSTEMS:  No CV, , pulmonary, eye, ENT system symptoms on review. 

Reliability poor.



MENTAL STATUS EXAM:  Oriented to herself.  Insight, judgment, recent and remote

memory, attention, concentration, fund of knowledge poor, consistent with her

diagnoses.



LABORATORY DATA:  Schizoaffective disorder, bipolar type, mixed with psychotic

features; psychotic disorder, unspecified; major neurocognitive disorder,

Alzheimer, vascular with delusion, depression.  Rest unchanged.



PLAN:  Continue psychotropics.  Encourage compliance.  Adjust Depakote post labs

level.





______________________________

MAN CURTIS HOYT MD



DR:  ARIE/kain  JOB#:  [id number] / [id number]

DD:  04/27/2018 17:19  DT:  04/28/2018 21:43

## 2018-04-28 NOTE — PDOC
Exam


Note:


Javier Note:


Please also refer to the separate dictated note~for this date of service 

dictated separately.~Patient seen individually. Discussed the patient with 

Nursing staff reviewed the chart.~Reviewed interim history and current 

functioning. Reviewed vital signs,~Labs/ Radiology~and current medications 

noted below. Continue current treatment with the changes noted in the dictated 

addendum note





Assessment:


Vital Signs:





 Vital Signs








  Date Time  Temp Pulse Resp B/P (MAP) Pulse Ox O2 Delivery O2 Flow Rate FiO2


 


4/28/18 10:11  88  121/69    


 


4/28/18 06:35 98.8  20  94   


 


4/25/18 22:57      Room Air  








I&O











Intake and Output 


 


 4/28/18





 07:00


 


Intake Total 600 ml


 


Balance 600 ml


 


 


 


Intake Oral 600 ml








Labs:





 Laboratory Tests








Test


  4/28/18


14:02


 


White Blood Count


  8.9 x10^3/uL


(4.0-11.0)


 


Red Blood Count


  4.45 x10^6/uL


(3.50-5.40)


 


Hemoglobin


  12.3 g/dL


(12.0-15.5)


 


Hematocrit


  37.2 %


(36.0-47.0)


 


Mean Corpuscular Volume


  84 fL ()


 


 


Mean Corpuscular Hemoglobin 28 pg (25-35)  


 


Mean Corpuscular Hemoglobin


Concent 33 g/dL


(31-37)


 


Red Cell Distribution Width


  15.2 %


(11.5-14.5)  H


 


Platelet Count


  256 x10^3/uL


(140-400)


 


Neutrophils (%) (Auto) 54 % (31-73)  


 


Lymphocytes (%) (Auto) 33 % (24-48)  


 


Monocytes (%) (Auto) 11 % (0-9)  H


 


Eosinophils (%) (Auto) 2 % (0-3)  


 


Basophils (%) (Auto) 1 % (0-3)  


 


Neutrophils # (Auto)


  4.8 x10^3uL


(1.8-7.7)


 


Lymphocytes # (Auto)


  3.0 x10^3/uL


(1.0-4.8)


 


Monocytes # (Auto)


  0.9 x10^3/uL


(0.0-1.1)


 


Eosinophils # (Auto)


  0.1 x10^3/uL


(0.0-0.7)


 


Basophils # (Auto)


  0.1 x10^3/uL


(0.0-0.2)


 


Sodium Level


  142 mmol/L


(136-145)


 


Potassium Level


  3.6 mmol/L


(3.5-5.1)


 


Chloride Level


  103 mmol/L


()


 


Carbon Dioxide Level


  29 mmol/L


(21-32)


 


Anion Gap 10 (6-14)  


 


Blood Urea Nitrogen


  20 mg/dL


(7-20)


 


Creatinine


  0.9 mg/dL


(0.6-1.0)


 


Estimated GFR


(Cockcroft-Gault) 63.2  


 


 


BUN/Creatinine Ratio 22 (6-20)  H


 


Glucose Level


  89 mg/dL


(70-99)


 


Calcium Level


  9.0 mg/dL


(8.5-10.1)


 


Total Bilirubin


  0.3 mg/dL


(0.2-1.0)


 


Aspartate Amino Transferase


(AST) 19 U/L (15-37)


 


 


Alanine Aminotransferase (ALT)


  23 U/L (14-59)


 


 


Alkaline Phosphatase


  74 U/L


()


 


Total Protein


  7.0 g/dL


(6.4-8.2)


 


Albumin


  3.6 g/dL


(3.4-5.0)


 


Albumin/Globulin Ratio 1.1 (1.0-1.7)  


 


Valproic Acid Level


  33 mcg/mL


()  L


 


Valproic Acid Last Dose Date Pending  


 


Valproic Acid Last Dose Time Pending  











Current Medications:


Meds:





Current Medications


Acetaminophen (Tylenol) 650 mg PRN Q6HRS  PRN PO PAIN / TEMP;  Start 4/25/18 at 

05:00;  Stop 4/26/18 at 16:16;  Status DC


Multi-Ingredient Ointment (Analgesic Balm) 1 edin PRN QID  PRN TP MUSCLE PAIN;  

Start 4/25/18 at 05:00


Al Hydroxide/Mg Hydroxide (Mylanta Plus Xs) 15 ml PRN AFTMEALHC  PRN PO 

DYSPEPSIA;  Start 4/25/18 at 05:00;  Stop 4/26/18 at 16:16;  Status DC


Magnesium Hydroxide (Milk Of Magnesia) 2,400 mg PRN QHS  PRN PO CONSTIPATION;  

Start 4/25/18 at 05:00;  Stop 4/26/18 at 16:16;  Status DC


Acetaminophen (Tylenol) 650 mg PRN Q4HRS  PRN PO PAIN / TEMP;  Start 4/25/18 at 

05:30


Amlodipine Besylate (Norvasc) 5 mg DAILY PO  Last administered on 4/28/18at 10:

11;  Start 4/25/18 at 09:00


Atorvastatin Calcium (Lipitor) 10 mg QHS PO  Last administered on 4/28/18at 20:

48;  Start 4/25/18 at 21:00


Bismuth Subsalicylate (Pepto-Bismol) 262 mg PRN Q4HRS  PRN PO DYSPEPSIA;  Start 

4/25/18 at 05:30


Ferrous Sulfate (Feosol) 325 mg BIDAFTMEAL PO  Last administered on 4/28/18at 10

:10;  Start 4/25/18 at 09:00


Gabapentin (Neurontin) 300 mg DAILY PO  Last administered on 4/28/18at 10:10;  

Start 4/25/18 at 09:00


Acetaminophen/ Hydrocodone Bitart (Lortab 5/325) 1 tab TID  PRN PO PAIN Last 

administered on 4/25/18at 22:57;  Start 4/25/18 at 05:30


Al Hydroxide/Mg Hydroxide (Mylanta Plus Xs) 30 ml PRN Q4HRS  PRN PO DYSPEPSIA;  

Start 4/25/18 at 05:30


Timolol Maleate (Timoptic 0.5% Ophth) 1 drop DAILY OU  Last administered on 4/26 /18at 10:58;  Start 4/25/18 at 09:00


Vitamin D (Vitamin D3) 50,000 unit WEEKLY PO  Last administered on 4/27/18at 18:

14;  Start 4/27/18 at 09:00


Hydrochlorothiazide (Hydrodiuril) 25 mg DAILY PO  Last administered on 4/28/ 18at 10:11;  Start 4/25/18 at 09:00


Loperamide HCl (Imodium) 2 mg PRN Q2HR  PRN PO DIARRHEA;  Start 4/25/18 at 05:30


Magnesium Hydroxide (Milk Of Magnesia) 2,400 mg PRN DAILY  PRN PO CONSTIPATION;

  Start 4/25/18 at 05:30


Metformin HCl (Glucophage) 1,000 mg BIDWMEALS PO  Last administered on 4/28/ 18at 10:11;  Start 4/25/18 at 08:00


Potassium Chloride (Klor-Con) 10 meq BIDWMEALS PO  Last administered on 4/28/ 18at 10:11;  Start 4/25/18 at 08:00


Linagliptin (Tradjenta) 5 mg DAILY06 PO  Last administered on 4/28/18at 06:44;  

Start 4/25/18 at 06:00


Non-Formulary Medication (Solifenacin Succinate (Vesicare)) 5 mg DAILY PO ;  

Start 4/25/18 at 09:00;  Stop 4/25/18 at 09:00;  Status DC


Oxybutynin Chloride (Ditropan) 5 mg BID PO  Last administered on 4/28/18at 20:48

;  Start 4/25/18 at 09:00


Benztropine Mesylate (Cogentin) 1 mg DAILY PO  Last administered on 4/28/18at 10

:11;  Start 4/25/18 at 11:00


Olanzapine (ZyPREXA ZYDIS) 2.5 mg PRN Q2HR  PRN PO PSYCHOSIS Last administered 

on 4/25/18at 11:04;  Start 4/25/18 at 10:45;  Stop 4/25/18 at 11:26;  Status DC


Olanzapine (ZyPREXA ZYDIS) 5 mg PRN Q2HR  PRN PO PSYCHOSIS Last administered on 

4/28/18at 11:20;  Start 4/25/18 at 11:30


Lorazepam (Ativan) 0.5 mg PRN Q2HR  PRN PO ANXIETY / AGITATION Last 

administered on 4/26/18at 20:14;  Start 4/25/18 at 11:30


Valproic Acid (Depakene) 500 mg QHS PO ;  Start 4/25/18 at 21:00;  Stop 4/26/18 

at 18:46;  Status DC


Haloperidol Lactate (Haldol) 5 mg DAILY IM  Last administered on 4/27/18at 19:42

;  Start 4/26/18 at 03:15;  Stop 4/28/18 at 05:13;  Status DC


Lorazepam (Ativan) 1 mg DAILY IM  Last administered on 4/27/18at 21:12;  Start 4 /26/18 at 03:15


Valproic Acid (Depakene) 500 mg QHS PO  Last administered on 4/27/18at 19:15;  

Start 4/26/18 at 21:00;  Stop 4/28/18 at 15:44;  Status DC


Trazodone HCl (Desyrel) 100 mg QHS PO  Last administered on 4/28/18at 20:48;  

Start 4/27/18 at 21:00


Trazodone HCl (Desyrel) 100 mg PRN QHS  PRN PO INSOMNIA;  Start 4/27/18 at 20:00


Haloperidol (Haldol) 10 mg DAILY PO  Last administered on 4/28/18at 10:13;  

Start 4/28/18 at 09:00


Valproic Acid (Depakene) 500 mg BID PO  Last administered on 4/28/18at 20:48;  

Start 4/28/18 at 16:00





Active Scripts


Active


Reported


Benztropine Mesylate 1 Mg Tablet 1 Mg PO DAILY


Atorvastatin Calcium 10 Mg Tablet 10 Mg PO QHS


Amlodipine Besylate 5 Mg Tablet 5 Mg PO DAILY


Januvia (Sitagliptin Phosphate) 100 Mg Tablet 100 Mg PO DAILY06


Potassium Chloride 10 Meq Tablet.er 10 Meq PO BID


Metformin Hcl 1,000 Mg Tablet 1,000 Mg PO BID


Hydrocodone-Apap 5-325  ** (Hydrocodone Bit/Acetaminophen) 1 Each Tablet 1 Tab 

PO TID PRN


Hydrochlorothiazide Tablet (Hydrochlorothiazide) 12.5 Mg Tablet 25 Mg PO DAILY


Gabapentin 300 Mg Capsule 300 Mg PO DAILY


Ferrous Sulfate 325 Mg Tablet 325 Mg PO BID


Vitamin D2 (Ergocalciferol (Vitamin D2)) 50,000 Unit Capsule 50,000 Unit PO QFR


Vesicare (Solifenacin Succinate) 5 Mg Tablet 5 Mg PO DAILY


Timoptic (Timolol Maleate) 10 Ml Drops 1 Ml OU DAILY


Milk Of Magnesia (Magnesium Hydroxide) 2,400 Mg/10 Ml Oral.susp 2,400 Mg PO PRN 

DAILY PRN


Maalox Maximum Strength Susp (Mag Hydrox/Al Hydrox/Simeth) 355 Ml Oral.susp 30 

Ml PO PRN Q4HRS PRN


Loperamide (Loperamide Hcl) 2 Mg Tablet 2 Mg PO PRN Q2HR PRN


Bismatrol (Bismuth Subsalicylate) 262 Mg/15 Ml Oral.susp 262 Mg PO PRN Q4HRS PRN


Tylenol (Acetaminophen) 325 Mg Tablet 650 Mg PO PRN Q4HRS PRN


Zyprexa Zydis (Olanzapine) 5 Mg Tab.rapdis 2.5 Mg PO PRN Q2HR PRN


Haldol Decanoate 100 (Haloperidol Decanoate) 100 Mg/1 Ml Ampul 50 Mg IM [id number]


Invega Sustenna (Paliperidone Palmitate) 234 Mg/1.5 Ml Disp.syrin 234 Mg IM 

QMONTH


I have reviewed the current psychotropics carefully including drug 

interactions.  Risk benefit ratio favors no change other than as noted in my 

dictated progress note.





Diagnosis:


Problems:  


(1) Anxiety disorder


(2) Bipolar affective, mixed, sev w/ psych


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


(5) Dementia, vascular, with delusions


(6) Impulse control disorder


(7) Schizoaffective disorder, chronic condition with acute exacerbation


(8) Schizoaffective disorder











MARYAM HOYT MD Apr 28, 2018 22:31

## 2018-04-29 VITALS — DIASTOLIC BLOOD PRESSURE: 75 MMHG | SYSTOLIC BLOOD PRESSURE: 127 MMHG

## 2018-04-29 RX ADMIN — LINAGLIPTIN SCH MG: 5 TABLET, FILM COATED ORAL at 06:27

## 2018-04-29 RX ADMIN — TRAZODONE HYDROCHLORIDE SCH MG: 100 TABLET ORAL at 20:07

## 2018-04-29 RX ADMIN — BENZTROPINE MESYLATE SCH MG: 1 TABLET ORAL at 09:33

## 2018-04-29 RX ADMIN — TIMOLOL MALEATE SCH DROP: 5 SOLUTION/ DROPS OPHTHALMIC at 09:33

## 2018-04-29 RX ADMIN — VALPROIC ACID SCH MG: 250 SOLUTION ORAL at 09:34

## 2018-04-29 RX ADMIN — ATORVASTATIN CALCIUM SCH MG: 10 TABLET, FILM COATED ORAL at 21:00

## 2018-04-29 RX ADMIN — TRAZODONE HYDROCHLORIDE SCH MG: 100 TABLET ORAL at 21:00

## 2018-04-29 RX ADMIN — POTASSIUM CHLORIDE SCH MEQ: 750 TABLET, EXTENDED RELEASE ORAL at 16:32

## 2018-04-29 RX ADMIN — TIMOLOL MALEATE SCH DROP: 5 SOLUTION/ DROPS OPHTHALMIC at 09:00

## 2018-04-29 RX ADMIN — GABAPENTIN SCH MG: 300 CAPSULE ORAL at 09:32

## 2018-04-29 RX ADMIN — Medication SCH MG: at 16:32

## 2018-04-29 RX ADMIN — VALPROIC ACID SCH MG: 250 SOLUTION ORAL at 21:00

## 2018-04-29 RX ADMIN — Medication SCH MG: at 09:33

## 2018-04-29 RX ADMIN — OXYBUTYNIN CHLORIDE SCH MG: 5 TABLET ORAL at 20:06

## 2018-04-29 RX ADMIN — OXYBUTYNIN CHLORIDE SCH MG: 5 TABLET ORAL at 09:32

## 2018-04-29 RX ADMIN — VALPROIC ACID SCH MG: 250 SOLUTION ORAL at 20:06

## 2018-04-29 RX ADMIN — ATORVASTATIN CALCIUM SCH MG: 10 TABLET, FILM COATED ORAL at 20:06

## 2018-04-29 RX ADMIN — HALOPERIDOL SCH MG: 5 TABLET ORAL at 09:33

## 2018-04-29 RX ADMIN — OXYBUTYNIN CHLORIDE SCH MG: 5 TABLET ORAL at 21:00

## 2018-04-29 RX ADMIN — POTASSIUM CHLORIDE SCH MEQ: 750 TABLET, EXTENDED RELEASE ORAL at 09:32

## 2018-04-29 NOTE — PDOC
Exam


Note:


Javier Note:


Please also refer to the separate dictated note~for this date of service 

dictated separately.~Patient seen individually. Discussed the patient with 

Nursing staff reviewed the chart.~Reviewed interim history and current 

functioning. Reviewed vital signs,~Labs/ Radiology~and current medications 

noted below. Continue current treatment with the changes noted in the dictated 

addendum note





Assessment:


Vital Signs:





 Vital Signs








  Date Time  Temp Pulse Resp B/P (MAP) Pulse Ox O2 Delivery O2 Flow Rate FiO2


 


4/29/18 09:33  79  127/75    


 


4/29/18 06:24 97.4  20  97   


 


4/25/18 22:57      Room Air  








I&O











Intake and Output 


 


 4/29/18





 07:00


 


Intake Total 900 ml


 


Balance 900 ml


 


 


 


Intake Oral 900 ml











Current Medications:


Meds:





Current Medications


Acetaminophen (Tylenol) 650 mg PRN Q6HRS  PRN PO PAIN / TEMP;  Start 4/25/18 at 

05:00;  Stop 4/26/18 at 16:16;  Status DC


Multi-Ingredient Ointment (Analgesic Balm) 1 edin PRN QID  PRN TP MUSCLE PAIN;  

Start 4/25/18 at 05:00


Al Hydroxide/Mg Hydroxide (Mylanta Plus Xs) 15 ml PRN AFTMEALHC  PRN PO 

DYSPEPSIA;  Start 4/25/18 at 05:00;  Stop 4/26/18 at 16:16;  Status DC


Magnesium Hydroxide (Milk Of Magnesia) 2,400 mg PRN QHS  PRN PO CONSTIPATION;  

Start 4/25/18 at 05:00;  Stop 4/26/18 at 16:16;  Status DC


Acetaminophen (Tylenol) 650 mg PRN Q4HRS  PRN PO PAIN / TEMP;  Start 4/25/18 at 

05:30


Amlodipine Besylate (Norvasc) 5 mg DAILY PO  Last administered on 4/29/18at 09:

33;  Start 4/25/18 at 09:00


Atorvastatin Calcium (Lipitor) 10 mg QHS PO  Last administered on 4/28/18at 20:

48;  Start 4/25/18 at 21:00


Bismuth Subsalicylate (Pepto-Bismol) 262 mg PRN Q4HRS  PRN PO DYSPEPSIA;  Start 

4/25/18 at 05:30


Ferrous Sulfate (Feosol) 325 mg BIDAFTMEAL PO  Last administered on 4/29/18at 16

:32;  Start 4/25/18 at 09:00


Gabapentin (Neurontin) 300 mg DAILY PO  Last administered on 4/29/18at 09:32;  

Start 4/25/18 at 09:00


Acetaminophen/ Hydrocodone Bitart (Lortab 5/325) 1 tab TID  PRN PO PAIN Last 

administered on 4/25/18at 22:57;  Start 4/25/18 at 05:30


Al Hydroxide/Mg Hydroxide (Mylanta Plus Xs) 30 ml PRN Q4HRS  PRN PO DYSPEPSIA;  

Start 4/25/18 at 05:30


Timolol Maleate (Timoptic 0.5% Ophth) 1 drop DAILY OU  Last administered on 4/26 /18at 10:58;  Start 4/25/18 at 09:00


Vitamin D (Vitamin D3) 50,000 unit WEEKLY PO  Last administered on 4/27/18at 18:

14;  Start 4/27/18 at 09:00


Hydrochlorothiazide (Hydrodiuril) 25 mg DAILY PO  Last administered on 4/29/ 18at 09:35;  Start 4/25/18 at 09:00


Loperamide HCl (Imodium) 2 mg PRN Q2HR  PRN PO DIARRHEA;  Start 4/25/18 at 05:30


Magnesium Hydroxide (Milk Of Magnesia) 2,400 mg PRN DAILY  PRN PO CONSTIPATION;

  Start 4/25/18 at 05:30


Metformin HCl (Glucophage) 1,000 mg BIDWMEALS PO  Last administered on 4/29/ 18at 16:32;  Start 4/25/18 at 08:00


Potassium Chloride (Klor-Con) 10 meq BIDWMEALS PO  Last administered on 4/29/ 18at 16:32;  Start 4/25/18 at 08:00


Linagliptin (Tradjenta) 5 mg DAILY06 PO  Last administered on 4/29/18at 06:27;  

Start 4/25/18 at 06:00


Non-Formulary Medication (Solifenacin Succinate (Vesicare)) 5 mg DAILY PO ;  

Start 4/25/18 at 09:00;  Stop 4/25/18 at 09:00;  Status DC


Oxybutynin Chloride (Ditropan) 5 mg BID PO  Last administered on 4/29/18at 09:32

;  Start 4/25/18 at 09:00


Benztropine Mesylate (Cogentin) 1 mg DAILY PO  Last administered on 4/29/18at 09

:33;  Start 4/25/18 at 11:00


Olanzapine (ZyPREXA ZYDIS) 2.5 mg PRN Q2HR  PRN PO PSYCHOSIS Last administered 

on 4/25/18at 11:04;  Start 4/25/18 at 10:45;  Stop 4/25/18 at 11:26;  Status DC


Olanzapine (ZyPREXA ZYDIS) 5 mg PRN Q2HR  PRN PO PSYCHOSIS Last administered on 

4/28/18at 11:20;  Start 4/25/18 at 11:30


Lorazepam (Ativan) 0.5 mg PRN Q2HR  PRN PO ANXIETY / AGITATION Last 

administered on 4/26/18at 20:14;  Start 4/25/18 at 11:30


Valproic Acid (Depakene) 500 mg QHS PO ;  Start 4/25/18 at 21:00;  Stop 4/26/18 

at 18:46;  Status DC


Haloperidol Lactate (Haldol) 5 mg DAILY IM  Last administered on 4/27/18at 19:42

;  Start 4/26/18 at 03:15;  Stop 4/28/18 at 05:13;  Status DC


Lorazepam (Ativan) 1 mg DAILY IM  Last administered on 4/27/18at 21:12;  Start 4 /26/18 at 03:15;  Stop 4/29/18 at 18:47;  Status DC


Valproic Acid (Depakene) 500 mg QHS PO  Last administered on 4/27/18at 19:15;  

Start 4/26/18 at 21:00;  Stop 4/28/18 at 15:44;  Status DC


Trazodone HCl (Desyrel) 100 mg QHS PO  Last administered on 4/28/18at 20:48;  

Start 4/27/18 at 21:00


Trazodone HCl (Desyrel) 100 mg PRN QHS  PRN PO INSOMNIA;  Start 4/27/18 at 20:00


Haloperidol (Haldol) 10 mg DAILY PO  Last administered on 4/29/18at 09:33;  

Start 4/28/18 at 09:00


Valproic Acid (Depakene) 500 mg BID PO  Last administered on 4/29/18at 09:34;  

Start 4/28/18 at 16:00





Active Scripts


Active


Reported


Benztropine Mesylate 1 Mg Tablet 1 Mg PO DAILY


Atorvastatin Calcium 10 Mg Tablet 10 Mg PO QHS


Amlodipine Besylate 5 Mg Tablet 5 Mg PO DAILY


Januvia (Sitagliptin Phosphate) 100 Mg Tablet 100 Mg PO DAILY06


Potassium Chloride 10 Meq Tablet.er 10 Meq PO BID


Metformin Hcl 1,000 Mg Tablet 1,000 Mg PO BID


Hydrocodone-Apap 5-325  ** (Hydrocodone Bit/Acetaminophen) 1 Each Tablet 1 Tab 

PO TID PRN


Hydrochlorothiazide Tablet (Hydrochlorothiazide) 12.5 Mg Tablet 25 Mg PO DAILY


Gabapentin 300 Mg Capsule 300 Mg PO DAILY


Ferrous Sulfate 325 Mg Tablet 325 Mg PO BID


Vitamin D2 (Ergocalciferol (Vitamin D2)) 50,000 Unit Capsule 50,000 Unit PO QFR


Vesicare (Solifenacin Succinate) 5 Mg Tablet 5 Mg PO DAILY


Timoptic (Timolol Maleate) 10 Ml Drops 1 Ml OU DAILY


Milk Of Magnesia (Magnesium Hydroxide) 2,400 Mg/10 Ml Oral.susp 2,400 Mg PO PRN 

DAILY PRN


Maalox Maximum Strength Susp (Mag Hydrox/Al Hydrox/Simeth) 355 Ml Oral.susp 30 

Ml PO PRN Q4HRS PRN


Loperamide (Loperamide Hcl) 2 Mg Tablet 2 Mg PO PRN Q2HR PRN


Bismatrol (Bismuth Subsalicylate) 262 Mg/15 Ml Oral.susp 262 Mg PO PRN Q4HRS PRN


Tylenol (Acetaminophen) 325 Mg Tablet 650 Mg PO PRN Q4HRS PRN


Zyprexa Zydis (Olanzapine) 5 Mg Tab.rapdis 2.5 Mg PO PRN Q2HR PRN


Haldol Decanoate 100 (Haloperidol Decanoate) 100 Mg/1 Ml Ampul 50 Mg IM [id number]


Invega Sustenna (Paliperidone Palmitate) 234 Mg/1.5 Ml Disp.syrin 234 Mg IM 

QMONTH


I have reviewed the current psychotropics carefully including drug 

interactions.  Risk benefit ratio favors no change other than as noted in my 

dictated progress note.





Diagnosis:


Problems:  


(1) Anxiety disorder


(2) Bipolar affective, mixed, sev w/ psych


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


(5) Dementia, vascular, with delusions


(6) Impulse control disorder


(7) Schizoaffective disorder, chronic condition with acute exacerbation


(8) Schizoaffective disorder











MARYAM HOYT MD Apr 29, 2018 22:22

## 2018-04-29 NOTE — PN
DATE:  04/27/2018



This is a late entry of 04/27/2018 covers elements not covered in my initial

note of 04/27/2018.



SUBJECTIVE:  I met with the patient in the evening.  She slept 3 hours, had a

better day, still hyperverbal, did lie down to rest after lunch.



REVIEW OF SYSTEMS:  No CV, , pulmonary, eye, ENT system symptoms on review. 

Reliability poor.



MENTAL STATUS EXAM:  Oriented to herself.  Insight, judgment, recent and remote

memory, attention, concentration, fund of knowledge poor, consistent with her

diagnosis.



IMPRESSION:  Schizoaffective disorder, bipolar type, mixed with psychotic

features.



PLAN:  Change IM Haldol, Ativan to p.o. Haldol 10 mg along with Ativan p.r.n. 

Start trazodone 100 mg at bedtime, may repeat x 1 for insomnia.  Continue rest

unchanged.  Encourage compliance with psychotropics.





______________________________

MARYAM HOYT MD



DR:  ARIE/kain  JOB#:  [id number] / [id number]

DD:  04/29/2018 11:49  DT:  04/29/2018 23:41

## 2018-04-30 VITALS — SYSTOLIC BLOOD PRESSURE: 120 MMHG | DIASTOLIC BLOOD PRESSURE: 73 MMHG

## 2018-04-30 VITALS — SYSTOLIC BLOOD PRESSURE: 136 MMHG | DIASTOLIC BLOOD PRESSURE: 83 MMHG

## 2018-04-30 RX ADMIN — OXYBUTYNIN CHLORIDE SCH MG: 5 TABLET ORAL at 10:34

## 2018-04-30 RX ADMIN — Medication SCH MG: at 17:16

## 2018-04-30 RX ADMIN — VALPROIC ACID SCH MG: 250 SOLUTION ORAL at 20:10

## 2018-04-30 RX ADMIN — TRAZODONE HYDROCHLORIDE SCH MG: 100 TABLET ORAL at 20:10

## 2018-04-30 RX ADMIN — HALOPERIDOL SCH MG: 5 TABLET ORAL at 10:34

## 2018-04-30 RX ADMIN — VALPROIC ACID SCH MG: 250 SOLUTION ORAL at 10:34

## 2018-04-30 RX ADMIN — ATORVASTATIN CALCIUM SCH MG: 10 TABLET, FILM COATED ORAL at 20:09

## 2018-04-30 RX ADMIN — POTASSIUM CHLORIDE SCH MEQ: 750 TABLET, EXTENDED RELEASE ORAL at 17:17

## 2018-04-30 RX ADMIN — BENZTROPINE MESYLATE SCH MG: 1 TABLET ORAL at 10:34

## 2018-04-30 RX ADMIN — TRAZODONE HYDROCHLORIDE SCH MG: 100 TABLET ORAL at 02:30

## 2018-04-30 RX ADMIN — OXYBUTYNIN CHLORIDE SCH MG: 5 TABLET ORAL at 02:30

## 2018-04-30 RX ADMIN — POTASSIUM CHLORIDE SCH MEQ: 750 TABLET, EXTENDED RELEASE ORAL at 10:36

## 2018-04-30 RX ADMIN — GABAPENTIN SCH MG: 300 CAPSULE ORAL at 10:34

## 2018-04-30 RX ADMIN — TIMOLOL MALEATE SCH DROP: 5 SOLUTION/ DROPS OPHTHALMIC at 10:00

## 2018-04-30 RX ADMIN — LINAGLIPTIN SCH MG: 5 TABLET, FILM COATED ORAL at 05:19

## 2018-04-30 RX ADMIN — OXYBUTYNIN CHLORIDE SCH MG: 5 TABLET ORAL at 20:09

## 2018-04-30 RX ADMIN — Medication SCH MG: at 10:35

## 2018-04-30 RX ADMIN — ATORVASTATIN CALCIUM SCH MG: 10 TABLET, FILM COATED ORAL at 02:30

## 2018-04-30 RX ADMIN — VALPROIC ACID SCH MG: 250 SOLUTION ORAL at 02:30

## 2018-04-30 NOTE — PDOC
Exam


Note:


Javier Note:


Please also refer to the separate dictated note~for this date of service 

dictated separately.~Patient seen individually. Discussed the patient with 

Nursing staff reviewed the chart.~Reviewed interim history and current 

functioning. Reviewed vital signs,~Labs/ Radiology~and current medications 

noted below. Continue current treatment with the changes noted in the dictated 

addendum note





Assessment:


Vital Signs:





 Vital Signs








  Date Time  Temp Pulse Resp B/P (MAP) Pulse Ox O2 Delivery O2 Flow Rate FiO2


 


4/30/18 16:12 97.1 72 17 136/83 (100) 97   


 


4/25/18 22:57      Room Air  








I&O











Intake and Output 


 


 4/30/18





 07:00


 


Intake Total 1560 ml


 


Balance 1560 ml


 


 


 


Intake Oral 1560 ml


 


# Voids 1








Labs:





 Laboratory Tests








Test


  4/30/18


07:28


 


Glucose (Fingerstick)


  85 mg/dL


(70-99)











Current Medications:


Meds:





Current Medications


Acetaminophen (Tylenol) 650 mg PRN Q6HRS  PRN PO PAIN / TEMP;  Start 4/25/18 at 

05:00;  Stop 4/26/18 at 16:16;  Status DC


Multi-Ingredient Ointment (Analgesic Balm) 1 edin PRN QID  PRN TP MUSCLE PAIN;  

Start 4/25/18 at 05:00


Al Hydroxide/Mg Hydroxide (Mylanta Plus Xs) 15 ml PRN AFTMEALHC  PRN PO 

DYSPEPSIA;  Start 4/25/18 at 05:00;  Stop 4/26/18 at 16:16;  Status DC


Magnesium Hydroxide (Milk Of Magnesia) 2,400 mg PRN QHS  PRN PO CONSTIPATION;  

Start 4/25/18 at 05:00;  Stop 4/26/18 at 16:16;  Status DC


Acetaminophen (Tylenol) 650 mg PRN Q4HRS  PRN PO PAIN / TEMP;  Start 4/25/18 at 

05:30


Amlodipine Besylate (Norvasc) 5 mg DAILY PO  Last administered on 4/30/18at 10:

35;  Start 4/25/18 at 09:00


Atorvastatin Calcium (Lipitor) 10 mg QHS PO  Last administered on 4/30/18at 20:

09;  Start 4/25/18 at 21:00


Bismuth Subsalicylate (Pepto-Bismol) 262 mg PRN Q4HRS  PRN PO DYSPEPSIA;  Start 

4/25/18 at 05:30


Ferrous Sulfate (Feosol) 325 mg BIDAFTMEAL PO  Last administered on 4/30/18at 17

:16;  Start 4/25/18 at 09:00


Gabapentin (Neurontin) 300 mg DAILY PO  Last administered on 4/30/18at 10:34;  

Start 4/25/18 at 09:00


Acetaminophen/ Hydrocodone Bitart (Lortab 5/325) 1 tab TID  PRN PO PAIN Last 

administered on 4/25/18at 22:57;  Start 4/25/18 at 05:30


Al Hydroxide/Mg Hydroxide (Mylanta Plus Xs) 30 ml PRN Q4HRS  PRN PO DYSPEPSIA;  

Start 4/25/18 at 05:30


Timolol Maleate (Timoptic 0.5% Ophth) 1 drop DAILY OU  Last administered on 4/26 /18at 10:58;  Start 4/25/18 at 09:00


Vitamin D (Vitamin D3) 50,000 unit WEEKLY PO  Last administered on 4/27/18at 18:

14;  Start 4/27/18 at 09:00


Hydrochlorothiazide (Hydrodiuril) 25 mg DAILY PO  Last administered on 4/30/ 18at 10:35;  Start 4/25/18 at 09:00


Loperamide HCl (Imodium) 2 mg PRN Q2HR  PRN PO DIARRHEA;  Start 4/25/18 at 05:30


Magnesium Hydroxide (Milk Of Magnesia) 2,400 mg PRN DAILY  PRN PO CONSTIPATION;

  Start 4/25/18 at 05:30


Metformin HCl (Glucophage) 1,000 mg BIDWMEALS PO  Last administered on 4/30/ 18at 17:16;  Start 4/25/18 at 08:00


Potassium Chloride (Klor-Con) 10 meq BIDWMEALS PO  Last administered on 4/30/ 18at 17:17;  Start 4/25/18 at 08:00


Linagliptin (Tradjenta) 5 mg DAILY06 PO  Last administered on 4/30/18at 05:19;  

Start 4/25/18 at 06:00


Non-Formulary Medication (Solifenacin Succinate (Vesicare)) 5 mg DAILY PO ;  

Start 4/25/18 at 09:00;  Stop 4/25/18 at 09:00;  Status DC


Oxybutynin Chloride (Ditropan) 5 mg BID PO  Last administered on 4/30/18at 20:09

;  Start 4/25/18 at 09:00


Benztropine Mesylate (Cogentin) 1 mg DAILY PO  Last administered on 4/30/18at 10

:34;  Start 4/25/18 at 11:00


Olanzapine (ZyPREXA ZYDIS) 2.5 mg PRN Q2HR  PRN PO PSYCHOSIS Last administered 

on 4/25/18at 11:04;  Start 4/25/18 at 10:45;  Stop 4/25/18 at 11:26;  Status DC


Olanzapine (ZyPREXA ZYDIS) 5 mg PRN Q2HR  PRN PO PSYCHOSIS Last administered on 

4/30/18at 10:00;  Start 4/25/18 at 11:30


Lorazepam (Ativan) 0.5 mg PRN Q2HR  PRN PO ANXIETY / AGITATION Last 

administered on 4/26/18at 20:14;  Start 4/25/18 at 11:30


Valproic Acid (Depakene) 500 mg QHS PO ;  Start 4/25/18 at 21:00;  Stop 4/26/18 

at 18:46;  Status DC


Haloperidol Lactate (Haldol) 5 mg DAILY IM  Last administered on 4/27/18at 19:42

;  Start 4/26/18 at 03:15;  Stop 4/28/18 at 05:13;  Status DC


Lorazepam (Ativan) 1 mg DAILY IM  Last administered on 4/27/18at 21:12;  Start 4 /26/18 at 03:15;  Stop 4/29/18 at 18:47;  Status DC


Valproic Acid (Depakene) 500 mg QHS PO  Last administered on 4/27/18at 19:15;  

Start 4/26/18 at 21:00;  Stop 4/28/18 at 15:44;  Status DC


Trazodone HCl (Desyrel) 100 mg QHS PO  Last administered on 4/30/18at 20:10;  

Start 4/27/18 at 21:00


Trazodone HCl (Desyrel) 100 mg PRN QHS  PRN PO INSOMNIA;  Start 4/27/18 at 20:00


Haloperidol (Haldol) 10 mg DAILY PO  Last administered on 4/30/18at 10:34;  

Start 4/28/18 at 09:00;  Stop 4/30/18 at 19:17;  Status DC


Valproic Acid (Depakene) 500 mg BID PO  Last administered on 4/30/18at 20:10;  

Start 4/28/18 at 16:00


Haloperidol (Haldol) 15 mg DAILY PO ;  Start 5/1/18 at 09:00





Active Scripts


Active


Reported


Benztropine Mesylate 1 Mg Tablet 1 Mg PO DAILY


Atorvastatin Calcium 10 Mg Tablet 10 Mg PO QHS


Amlodipine Besylate 5 Mg Tablet 5 Mg PO DAILY


Januvia (Sitagliptin Phosphate) 100 Mg Tablet 100 Mg PO DAILY06


Potassium Chloride 10 Meq Tablet.er 10 Meq PO BID


Metformin Hcl 1,000 Mg Tablet 1,000 Mg PO BID


Hydrocodone-Apap 5-325  ** (Hydrocodone Bit/Acetaminophen) 1 Each Tablet 1 Tab 

PO TID PRN


Hydrochlorothiazide Tablet (Hydrochlorothiazide) 12.5 Mg Tablet 25 Mg PO DAILY


Gabapentin 300 Mg Capsule 300 Mg PO DAILY


Ferrous Sulfate 325 Mg Tablet 325 Mg PO BID


Vitamin D2 (Ergocalciferol (Vitamin D2)) 50,000 Unit Capsule 50,000 Unit PO QFR


Vesicare (Solifenacin Succinate) 5 Mg Tablet 5 Mg PO DAILY


Timoptic (Timolol Maleate) 10 Ml Drops 1 Ml OU DAILY


Milk Of Magnesia (Magnesium Hydroxide) 2,400 Mg/10 Ml Oral.susp 2,400 Mg PO PRN 

DAILY PRN


Maalox Maximum Strength Susp (Mag Hydrox/Al Hydrox/Simeth) 355 Ml Oral.susp 30 

Ml PO PRN Q4HRS PRN


Loperamide (Loperamide Hcl) 2 Mg Tablet 2 Mg PO PRN Q2HR PRN


Bismatrol (Bismuth Subsalicylate) 262 Mg/15 Ml Oral.susp 262 Mg PO PRN Q4HRS PRN


Tylenol (Acetaminophen) 325 Mg Tablet 650 Mg PO PRN Q4HRS PRN


Zyprexa Zydis (Olanzapine) 5 Mg Tab.rapdis 2.5 Mg PO PRN Q2HR PRN


Haldol Decanoate 100 (Haloperidol Decanoate) 100 Mg/1 Ml Ampul 50 Mg IM [id number]


Invega Sustenna (Paliperidone Palmitate) 234 Mg/1.5 Ml Disp.syrin 234 Mg IM 

QMONTH


I have reviewed the current psychotropics carefully including drug 

interactions.  Risk benefit ratio favors no change other than as noted in my 

dictated progress note.





Diagnosis:


Problems:  


(1) Anxiety disorder


(2) Bipolar affective, mixed, sev w/ psych


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


(5) Dementia, vascular, with delusions


(6) Impulse control disorder


(7) Schizoaffective disorder, chronic condition with acute exacerbation


(8) Schizoaffective disorder











MARYAM HOYT MD Apr 30, 2018 21:33

## 2018-05-01 VITALS — DIASTOLIC BLOOD PRESSURE: 77 MMHG | SYSTOLIC BLOOD PRESSURE: 120 MMHG

## 2018-05-01 VITALS — DIASTOLIC BLOOD PRESSURE: 75 MMHG | SYSTOLIC BLOOD PRESSURE: 131 MMHG

## 2018-05-01 LAB
ALBUMIN SERPL-MCNC: 3.5 G/DL (ref 3.4–5)
ALBUMIN/GLOB SERPL: 0.9 {RATIO} (ref 1–1.7)
ALP SERPL-CCNC: 81 U/L (ref 46–116)
ALT SERPL-CCNC: 23 U/L (ref 14–59)
ANION GAP SERPL CALC-SCNC: 10 MMOL/L (ref 6–14)
AST SERPL-CCNC: 12 U/L (ref 15–37)
BASOPHILS # BLD AUTO: 0.1 X10^3/UL (ref 0–0.2)
BASOPHILS NFR BLD: 1 % (ref 0–3)
BILIRUB SERPL-MCNC: 0.3 MG/DL (ref 0.2–1)
BUN/CREAT SERPL: 19 (ref 6–20)
CA-I SERPL ISE-MCNC: 17 MG/DL (ref 7–20)
CALCIUM SERPL-MCNC: 9.1 MG/DL (ref 8.5–10.1)
CHLORIDE SERPL-SCNC: 101 MMOL/L (ref 98–107)
CO2 SERPL-SCNC: 29 MMOL/L (ref 21–32)
CREAT SERPL-MCNC: 0.9 MG/DL (ref 0.6–1)
EOSINOPHIL NFR BLD: 0.1 X10^3/UL (ref 0–0.7)
EOSINOPHIL NFR BLD: 2 % (ref 0–3)
ERYTHROCYTE [DISTWIDTH] IN BLOOD BY AUTOMATED COUNT: 15.1 % (ref 11.5–14.5)
GFR SERPLBLD BASED ON 1.73 SQ M-ARVRAT: 63.2 ML/MIN
GLOBULIN SER-MCNC: 3.9 G/DL (ref 2.2–3.8)
GLUCOSE SERPL-MCNC: 167 MG/DL (ref 70–99)
HCT VFR BLD CALC: 39.4 % (ref 36–47)
HGB BLD-MCNC: 13.1 G/DL (ref 12–15.5)
LYMPHOCYTES # BLD: 1.5 X10^3/UL (ref 1–4.8)
LYMPHOCYTES NFR BLD AUTO: 22 % (ref 24–48)
MCH RBC QN AUTO: 28 PG (ref 25–35)
MCHC RBC AUTO-ENTMCNC: 33 G/DL (ref 31–37)
MCV RBC AUTO: 84 FL (ref 79–100)
MONO #: 0.6 X10^3/UL (ref 0–1.1)
MONOCYTES NFR BLD: 8 % (ref 0–9)
NEUT #: 4.7 X10^3UL (ref 1.8–7.7)
NEUTROPHILS NFR BLD AUTO: 67 % (ref 31–73)
PLATELET # BLD AUTO: 251 X10^3/UL (ref 140–400)
POTASSIUM SERPL-SCNC: 3.3 MMOL/L (ref 3.5–5.1)
PROT SERPL-MCNC: 7.4 G/DL (ref 6.4–8.2)
RBC # BLD AUTO: 4.67 X10^6/UL (ref 3.5–5.4)
SODIUM SERPL-SCNC: 140 MMOL/L (ref 136–145)
VAL ACID: 77 MCG/ML (ref 50–100)
WBC # BLD AUTO: 7 X10^3/UL (ref 4–11)

## 2018-05-01 RX ADMIN — TIMOLOL MALEATE SCH DROP: 5 SOLUTION/ DROPS OPHTHALMIC at 09:00

## 2018-05-01 RX ADMIN — LINAGLIPTIN SCH MG: 5 TABLET, FILM COATED ORAL at 05:57

## 2018-05-01 RX ADMIN — OXYBUTYNIN CHLORIDE SCH MG: 5 TABLET ORAL at 10:10

## 2018-05-01 RX ADMIN — POTASSIUM CHLORIDE SCH MEQ: 750 TABLET, EXTENDED RELEASE ORAL at 17:23

## 2018-05-01 RX ADMIN — HALOPERIDOL SCH MG: 5 TABLET ORAL at 10:14

## 2018-05-01 RX ADMIN — Medication SCH MG: at 17:23

## 2018-05-01 RX ADMIN — BENZTROPINE MESYLATE SCH MG: 1 TABLET ORAL at 10:10

## 2018-05-01 RX ADMIN — VALPROIC ACID SCH MG: 250 SOLUTION ORAL at 10:12

## 2018-05-01 RX ADMIN — OXYBUTYNIN CHLORIDE SCH MG: 5 TABLET ORAL at 19:22

## 2018-05-01 RX ADMIN — GABAPENTIN SCH MG: 300 CAPSULE ORAL at 10:17

## 2018-05-01 RX ADMIN — Medication SCH MG: at 10:10

## 2018-05-01 RX ADMIN — TRAZODONE HYDROCHLORIDE SCH MG: 100 TABLET ORAL at 19:22

## 2018-05-01 RX ADMIN — POTASSIUM CHLORIDE SCH MEQ: 750 TABLET, EXTENDED RELEASE ORAL at 10:11

## 2018-05-01 RX ADMIN — VALPROIC ACID SCH MG: 250 SOLUTION ORAL at 19:22

## 2018-05-01 RX ADMIN — ATORVASTATIN CALCIUM SCH MG: 10 TABLET, FILM COATED ORAL at 19:22

## 2018-05-01 NOTE — PN
DATE:  04/29/2018



PSYCHIATRIC PROGRESS NOTE



This late entry 04/29/2018 covers elements not covered in my initial note of

04/29/2018.



SUBJECTIVE:  I met with the patient in the evening.  She slept 6-3/4 hours

previous evening, took oral medications in the morning, IM Ativan has been

stopped.



REVIEW OF SYSTEMS:  No CV, , pulmonary, eye, ENT system symptoms on review. 

Reliability poor.



MENTAL STATUS EXAM:  Oriented to herself.  Insight, judgment, recent and remote

memory, attention, concentration, fund of knowledge poor, consistent with her

diagnosis.  She is still labile in her mood, less psychotic.



LABORATORIES:  Reviewed.



IMPRESSION:  Schizoaffective disorder, bipolar type, mixed with psychotic

features; cognitive disorder, unspecified.



PLAN:  Continue current psychotropics including oral Haldol.  She is also on

Cogentin, Haldol decanoate, trazodone, Depakene which was increased.  Repeat

labs on 05/01/2018.





______________________________

MARYAM HOYT MD



DR:  ARIE/kain  JOB#:  [id number] / [id number]

DD:  04/30/2018 16:39  DT:  05/01/2018 04:15

## 2018-05-01 NOTE — PDOC
Exam


Note:


Javier Note:


Please also refer to the separate dictated note~for this date of service 

dictated separately.~Patient seen individually. Discussed the patient with 

Nursing staff reviewed the chart.~Reviewed interim history and current 

functioning. Reviewed vital signs,~Labs/ Radiology~and current medications 

noted below. Continue current treatment with the changes noted in the dictated 

addendum note





Assessment:


Vital Signs:





 Vital Signs








  Date Time  Temp Pulse Resp B/P (MAP) Pulse Ox O2 Delivery O2 Flow Rate FiO2


 


5/1/18 15:56 97.2 94 20 131/75 (93) 95   


 


4/25/18 22:57      Room Air  








I&O











Intake and Output 


 


 5/1/18





 07:00


 


Intake Total 480 ml


 


Balance 480 ml


 


 


 


Intake Oral 480 ml


 


# Voids 1








Labs:





 Laboratory Tests








Test


  5/1/18


07:26 5/1/18


10:35


 


Glucose (Fingerstick)


  92 mg/dL


(70-99) 


 


 


White Blood Count


  


  7.0 x10^3/uL


(4.0-11.0)


 


Red Blood Count


  


  4.67 x10^6/uL


(3.50-5.40)


 


Hemoglobin


  


  13.1 g/dL


(12.0-15.5)


 


Hematocrit


  


  39.4 %


(36.0-47.0)


 


Mean Corpuscular Volume


  


  84 fL ()


 


 


Mean Corpuscular Hemoglobin  28 pg (25-35)  


 


Mean Corpuscular Hemoglobin


Concent 


  33 g/dL


(31-37)


 


Red Cell Distribution Width


  


  15.1 %


(11.5-14.5)  H


 


Platelet Count


  


  251 x10^3/uL


(140-400)


 


Neutrophils (%) (Auto)  67 % (31-73)  


 


Lymphocytes (%) (Auto)  22 % (24-48)  L


 


Monocytes (%) (Auto)  8 % (0-9)  


 


Eosinophils (%) (Auto)  2 % (0-3)  


 


Basophils (%) (Auto)  1 % (0-3)  


 


Neutrophils # (Auto)


  


  4.7 x10^3uL


(1.8-7.7)


 


Lymphocytes # (Auto)


  


  1.5 x10^3/uL


(1.0-4.8)


 


Monocytes # (Auto)


  


  0.6 x10^3/uL


(0.0-1.1)


 


Eosinophils # (Auto)


  


  0.1 x10^3/uL


(0.0-0.7)


 


Basophils # (Auto)


  


  0.1 x10^3/uL


(0.0-0.2)


 


Sodium Level


  


  140 mmol/L


(136-145)


 


Potassium Level


  


  3.3 mmol/L


(3.5-5.1)  L


 


Chloride Level


  


  101 mmol/L


()


 


Carbon Dioxide Level


  


  29 mmol/L


(21-32)


 


Anion Gap  10 (6-14)  


 


Blood Urea Nitrogen


  


  17 mg/dL


(7-20)


 


Creatinine


  


  0.9 mg/dL


(0.6-1.0)


 


Estimated GFR


(Cockcroft-Gault) 


  63.2  


 


 


BUN/Creatinine Ratio  19 (6-20)  


 


Glucose Level


  


  167 mg/dL


(70-99)  H


 


Calcium Level


  


  9.1 mg/dL


(8.5-10.1)


 


Total Bilirubin


  


  0.3 mg/dL


(0.2-1.0)


 


Aspartate Amino Transferase


(AST) 


  12 U/L (15-37)


L


 


Alanine Aminotransferase (ALT)


  


  23 U/L (14-59)


 


 


Alkaline Phosphatase


  


  81 U/L


()


 


Total Protein


  


  7.4 g/dL


(6.4-8.2)


 


Albumin


  


  3.5 g/dL


(3.4-5.0)


 


Albumin/Globulin Ratio


  


  0.9 (1.0-1.7)


L


 


Valproic Acid Level


  


  77 mcg/mL


()


 


Valproic Acid Last Dose Date  04/30/2018  


 


Valproic Acid Last Dose Time  2100  











Current Medications:


Meds:





Current Medications


Acetaminophen (Tylenol) 650 mg PRN Q6HRS  PRN PO PAIN / TEMP;  Start 4/25/18 at 

05:00;  Stop 4/26/18 at 16:16;  Status DC


Multi-Ingredient Ointment (Analgesic Balm) 1 edin PRN QID  PRN TP MUSCLE PAIN;  

Start 4/25/18 at 05:00


Al Hydroxide/Mg Hydroxide (Mylanta Plus Xs) 15 ml PRN AFTMEALHC  PRN PO 

DYSPEPSIA;  Start 4/25/18 at 05:00;  Stop 4/26/18 at 16:16;  Status DC


Magnesium Hydroxide (Milk Of Magnesia) 2,400 mg PRN QHS  PRN PO CONSTIPATION;  

Start 4/25/18 at 05:00;  Stop 4/26/18 at 16:16;  Status DC


Acetaminophen (Tylenol) 650 mg PRN Q4HRS  PRN PO PAIN / TEMP;  Start 4/25/18 at 

05:30


Amlodipine Besylate (Norvasc) 5 mg DAILY PO  Last administered on 5/1/18at 10:17

;  Start 4/25/18 at 09:00


Atorvastatin Calcium (Lipitor) 10 mg QHS PO  Last administered on 5/1/18at 19:22

;  Start 4/25/18 at 21:00


Bismuth Subsalicylate (Pepto-Bismol) 262 mg PRN Q4HRS  PRN PO DYSPEPSIA;  Start 

4/25/18 at 05:30


Ferrous Sulfate (Feosol) 325 mg BIDAFTMEAL PO  Last administered on 5/1/18at 17:

23;  Start 4/25/18 at 09:00


Gabapentin (Neurontin) 300 mg DAILY PO  Last administered on 5/1/18at 10:17;  

Start 4/25/18 at 09:00


Acetaminophen/ Hydrocodone Bitart (Lortab 5/325) 1 tab TID  PRN PO PAIN Last 

administered on 4/25/18at 22:57;  Start 4/25/18 at 05:30


Al Hydroxide/Mg Hydroxide (Mylanta Plus Xs) 30 ml PRN Q4HRS  PRN PO DYSPEPSIA;  

Start 4/25/18 at 05:30


Timolol Maleate (Timoptic 0.5% Ophth) 1 drop DAILY OU  Last administered on 4/26 /18at 10:58;  Start 4/25/18 at 09:00


Vitamin D (Vitamin D3) 50,000 unit WEEKLY PO  Last administered on 4/27/18at 18:

14;  Start 4/27/18 at 09:00


Hydrochlorothiazide (Hydrodiuril) 25 mg DAILY PO  Last administered on 5/1/18at 

10:10;  Start 4/25/18 at 09:00


Loperamide HCl (Imodium) 2 mg PRN Q2HR  PRN PO DIARRHEA;  Start 4/25/18 at 05:30


Magnesium Hydroxide (Milk Of Magnesia) 2,400 mg PRN DAILY  PRN PO CONSTIPATION;

  Start 4/25/18 at 05:30


Metformin HCl (Glucophage) 1,000 mg BIDWMEALS PO  Last administered on 5/1/18at 

17:24;  Start 4/25/18 at 08:00


Potassium Chloride (Klor-Con) 10 meq BIDWMEALS PO  Last administered on 5/1/ 18at 17:23;  Start 4/25/18 at 08:00


Linagliptin (Tradjenta) 5 mg DAILY06 PO  Last administered on 5/1/18at 05:57;  

Start 4/25/18 at 06:00


Non-Formulary Medication (Solifenacin Succinate (Vesicare)) 5 mg DAILY PO ;  

Start 4/25/18 at 09:00;  Stop 4/25/18 at 09:00;  Status DC


Oxybutynin Chloride (Ditropan) 5 mg BID PO  Last administered on 5/1/18at 19:22

;  Start 4/25/18 at 09:00


Benztropine Mesylate (Cogentin) 1 mg DAILY PO  Last administered on 5/1/18at 10:

10;  Start 4/25/18 at 11:00


Olanzapine (ZyPREXA ZYDIS) 2.5 mg PRN Q2HR  PRN PO PSYCHOSIS Last administered 

on 4/25/18at 11:04;  Start 4/25/18 at 10:45;  Stop 4/25/18 at 11:26;  Status DC


Olanzapine (ZyPREXA ZYDIS) 5 mg PRN Q2HR  PRN PO PSYCHOSIS Last administered on 

5/1/18at 18:36;  Start 4/25/18 at 11:30


Lorazepam (Ativan) 0.5 mg PRN Q2HR  PRN PO ANXIETY / AGITATION Last 

administered on 5/1/18at 07:54;  Start 4/25/18 at 11:30


Valproic Acid (Depakene) 500 mg QHS PO ;  Start 4/25/18 at 21:00;  Stop 4/26/18 

at 18:46;  Status DC


Haloperidol Lactate (Haldol) 5 mg DAILY IM  Last administered on 4/27/18at 19:42

;  Start 4/26/18 at 03:15;  Stop 4/28/18 at 05:13;  Status DC


Lorazepam (Ativan) 1 mg DAILY IM  Last administered on 4/27/18at 21:12;  Start 4 /26/18 at 03:15;  Stop 4/29/18 at 18:47;  Status DC


Valproic Acid (Depakene) 500 mg QHS PO  Last administered on 4/27/18at 19:15;  

Start 4/26/18 at 21:00;  Stop 4/28/18 at 15:44;  Status DC


Trazodone HCl (Desyrel) 100 mg QHS PO  Last administered on 5/1/18at 19:22;  

Start 4/27/18 at 21:00


Trazodone HCl (Desyrel) 100 mg PRN QHS  PRN PO INSOMNIA;  Start 4/27/18 at 20:00


Haloperidol (Haldol) 10 mg DAILY PO  Last administered on 4/30/18at 10:34;  

Start 4/28/18 at 09:00;  Stop 4/30/18 at 19:17;  Status DC


Valproic Acid (Depakene) 500 mg BID PO  Last administered on 5/1/18at 19:22;  

Start 4/28/18 at 16:00


Haloperidol (Haldol) 15 mg DAILY PO  Last administered on 5/1/18at 10:14;  

Start 5/1/18 at 09:00





Active Scripts


Active


Reported


Benztropine Mesylate 1 Mg Tablet 1 Mg PO DAILY


Atorvastatin Calcium 10 Mg Tablet 10 Mg PO QHS


Amlodipine Besylate 5 Mg Tablet 5 Mg PO DAILY


Januvia (Sitagliptin Phosphate) 100 Mg Tablet 100 Mg PO DAILY06


Potassium Chloride 10 Meq Tablet.er 10 Meq PO BID


Metformin Hcl 1,000 Mg Tablet 1,000 Mg PO BID


Hydrocodone-Apap 5-325  ** (Hydrocodone Bit/Acetaminophen) 1 Each Tablet 1 Tab 

PO TID PRN


Hydrochlorothiazide Tablet (Hydrochlorothiazide) 12.5 Mg Tablet 25 Mg PO DAILY


Gabapentin 300 Mg Capsule 300 Mg PO DAILY


Ferrous Sulfate 325 Mg Tablet 325 Mg PO BID


Vitamin D2 (Ergocalciferol (Vitamin D2)) 50,000 Unit Capsule 50,000 Unit PO QFR


Vesicare (Solifenacin Succinate) 5 Mg Tablet 5 Mg PO DAILY


Timoptic (Timolol Maleate) 10 Ml Drops 1 Ml OU DAILY


Milk Of Magnesia (Magnesium Hydroxide) 2,400 Mg/10 Ml Oral.susp 2,400 Mg PO PRN 

DAILY PRN


Maalox Maximum Strength Susp (Mag Hydrox/Al Hydrox/Simeth) 355 Ml Oral.susp 30 

Ml PO PRN Q4HRS PRN


Loperamide (Loperamide Hcl) 2 Mg Tablet 2 Mg PO PRN Q2HR PRN


Bismatrol (Bismuth Subsalicylate) 262 Mg/15 Ml Oral.susp 262 Mg PO PRN Q4HRS PRN


Tylenol (Acetaminophen) 325 Mg Tablet 650 Mg PO PRN Q4HRS PRN


Zyprexa Zydis (Olanzapine) 5 Mg Tab.rapdis 2.5 Mg PO PRN Q2HR PRN


Haldol Decanoate 100 (Haloperidol Decanoate) 100 Mg/1 Ml Ampul 50 Mg IM [id number]


Invega Sustenna (Paliperidone Palmitate) 234 Mg/1.5 Ml Disp.syrin 234 Mg IM 

QMONTH


I have reviewed the current psychotropics carefully including drug 

interactions.  Risk benefit ratio favors no change other than as noted in my 

dictated progress note.





Diagnosis:


Problems:  


(1) Anxiety disorder


(2) Bipolar affective, mixed, sev w/ psych


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


(5) Dementia, vascular, with delusions


(6) Impulse control disorder


(7) Schizoaffective disorder, chronic condition with acute exacerbation


(8) Schizoaffective disorder











MARYAM HOYT MD May 1, 2018 21:34

## 2018-05-01 NOTE — PN
DATE:  04/28/2018



This is a late entry for 04/28/2018 covers elements not covered in my initial

note of 04/28/2018.



SUBJECTIVE:  I met with the patient individually.  The patient slept 7 hours. 

She was up at night muttering and hallucinating, takes her medications orally in

chocolate pudding, which in itself is an improvement.  We have stopped the IM

Haldol and Ativan and she is on the p.o.  Haldol 10 mg.  Valproic acid level is

subtherapeutic at 500 mg at bedtime of the liquid valproic acid and we will

increase it to 500 mg twice a day.  Check CBC, CMP, valproic acid level in 3

days.



REVIEW OF SYSTEMS:  No CV, , pulmonary, eye, ENT system symptoms on review. 

Reliability poor.



MENTAL STATUS EXAM:  Oriented to herself.  Insight, judgment, recent and remote

memory, attention, concentration, fund of knowledge poor, consistent with her

diagnosis.  The patient remains quite psychotic, labile, distracted, constantly

moving as I met with her.



LABORATORY DATA:  Reviewed.



IMPRESSION:  Schizoaffective disorder, bipolar type, mixed with psychotic

features; cognitive disorder, unspecified.



PLAN:  Continue current psychotropics with the changes noted above.





______________________________

MARYAM HOYT MD



DR:  ARIE/kain  JOB#:  [id number] / [id number]

DD:  04/30/2018 14:22  DT:  05/01/2018 03:00

## 2018-05-02 VITALS — SYSTOLIC BLOOD PRESSURE: 137 MMHG | DIASTOLIC BLOOD PRESSURE: 73 MMHG

## 2018-05-02 VITALS — DIASTOLIC BLOOD PRESSURE: 62 MMHG | SYSTOLIC BLOOD PRESSURE: 109 MMHG

## 2018-05-02 RX ADMIN — OXYBUTYNIN CHLORIDE SCH MG: 5 TABLET ORAL at 11:02

## 2018-05-02 RX ADMIN — GABAPENTIN SCH MG: 300 CAPSULE ORAL at 11:02

## 2018-05-02 RX ADMIN — VALPROIC ACID SCH MG: 250 SOLUTION ORAL at 11:04

## 2018-05-02 RX ADMIN — LINAGLIPTIN SCH MG: 5 TABLET, FILM COATED ORAL at 05:50

## 2018-05-02 RX ADMIN — VALPROIC ACID SCH MG: 250 SOLUTION ORAL at 20:25

## 2018-05-02 RX ADMIN — POTASSIUM CHLORIDE SCH MEQ: 750 TABLET, EXTENDED RELEASE ORAL at 11:03

## 2018-05-02 RX ADMIN — Medication SCH MG: at 17:55

## 2018-05-02 RX ADMIN — HALOPERIDOL SCH MG: 5 TABLET ORAL at 11:02

## 2018-05-02 RX ADMIN — Medication SCH MG: at 11:04

## 2018-05-02 RX ADMIN — OXYBUTYNIN CHLORIDE SCH MG: 5 TABLET ORAL at 20:25

## 2018-05-02 RX ADMIN — TIMOLOL MALEATE SCH DROP: 5 SOLUTION/ DROPS OPHTHALMIC at 09:00

## 2018-05-02 RX ADMIN — BENZTROPINE MESYLATE SCH MG: 1 TABLET ORAL at 11:02

## 2018-05-02 RX ADMIN — ATORVASTATIN CALCIUM SCH MG: 10 TABLET, FILM COATED ORAL at 20:25

## 2018-05-02 RX ADMIN — HYDROCODONE BITARTRATE AND ACETAMINOPHEN PRN TAB: 5; 325 TABLET ORAL at 11:03

## 2018-05-02 RX ADMIN — TRAZODONE HYDROCHLORIDE SCH MG: 100 TABLET ORAL at 20:25

## 2018-05-02 RX ADMIN — POTASSIUM CHLORIDE SCH MEQ: 750 TABLET, EXTENDED RELEASE ORAL at 18:05

## 2018-05-02 NOTE — PN
DATE:  05/01/2018



This is a late entry for 05/01/2018 covers elements not covered in my initial

note of 05/01/2018.



SUBJECTIVE:  I met with the patient in the evening.  The patient slept just

1-1/2 hours previous evening, was quite agitated in the morning, intermittently

psychotic.  Complains of foot pain, received Ativan and Zyprexa, resistive to

labs.  Valproic acid level therapeutic at 77.



REVIEW OF SYSTEMS:  No CV, , pulmonary, eye system symptoms on review.



MENTAL STATUS EXAM:  Oriented to herself.  Insight, judgment, recent memory is

impaired.  Language function intact.  Attention span short.  Mood and affect

still labile, showing improvement.



LABORATORY DATA:  Reviewed.



IMPRESSION:  Schizoaffective disorder, bipolar type, mixed with psychotic

features.



PLAN:  Continue current psychotropics.  Haldol was increased to 15 mg p.o.

daily, Depakene 500 b.i.d., level therapeutic is noted.





______________________________

MARYAM HOYT MD



DR:  ARIE/kain  JOB#:  [id number] / [id number]

DD:  05/02/2018 14:59  DT:  05/02/2018 23:21

## 2018-05-02 NOTE — PDOC
Exam


Note:


Javier Note:


Please also refer to the separate dictated note~for this date of service 

dictated separately.~Patient seen individually. Discussed the patient with 

Nursing staff reviewed the chart.~Reviewed interim history and current 

functioning. Reviewed vital signs,~Labs/ Radiology~and current medications 

noted below. Continue current treatment with the changes noted in the dictated 

addendum note





Assessment:


Vital Signs:





 Vital Signs








  Date Time  Temp Pulse Resp B/P (MAP) Pulse Ox O2 Delivery O2 Flow Rate FiO2


 


5/2/18 16:50 97.3 80 22 109/62 (78) 94   


 


5/2/18 13:00      Room Air  








I&O











Intake and Output 


 


 5/2/18





 07:00


 


Intake Total 480 ml


 


Balance 480 ml


 


 


 


Intake Oral 480 ml


 


# Bowel Movements 1








Labs:





 Laboratory Tests








Test


  5/2/18


07:54


 


Glucose (Fingerstick)


  87 mg/dL


(70-99)











Current Medications:


Meds:





Current Medications


Acetaminophen (Tylenol) 650 mg PRN Q6HRS  PRN PO PAIN / TEMP;  Start 4/25/18 at 

05:00;  Stop 4/26/18 at 16:16;  Status DC


Multi-Ingredient Ointment (Analgesic Balm) 1 edin PRN QID  PRN TP MUSCLE PAIN;  

Start 4/25/18 at 05:00


Al Hydroxide/Mg Hydroxide (Mylanta Plus Xs) 15 ml PRN AFTMEALHC  PRN PO 

DYSPEPSIA;  Start 4/25/18 at 05:00;  Stop 4/26/18 at 16:16;  Status DC


Magnesium Hydroxide (Milk Of Magnesia) 2,400 mg PRN QHS  PRN PO CONSTIPATION;  

Start 4/25/18 at 05:00;  Stop 4/26/18 at 16:16;  Status DC


Acetaminophen (Tylenol) 650 mg PRN Q4HRS  PRN PO PAIN / TEMP;  Start 4/25/18 at 

05:30


Amlodipine Besylate (Norvasc) 5 mg DAILY PO  Last administered on 5/2/18at 11:04

;  Start 4/25/18 at 09:00


Atorvastatin Calcium (Lipitor) 10 mg QHS PO  Last administered on 5/2/18at 20:25

;  Start 4/25/18 at 21:00


Bismuth Subsalicylate (Pepto-Bismol) 262 mg PRN Q4HRS  PRN PO DYSPEPSIA;  Start 

4/25/18 at 05:30


Ferrous Sulfate (Feosol) 325 mg BIDAFTMEAL PO  Last administered on 5/2/18at 17:

55;  Start 4/25/18 at 09:00


Gabapentin (Neurontin) 300 mg DAILY PO  Last administered on 5/2/18at 11:02;  

Start 4/25/18 at 09:00


Acetaminophen/ Hydrocodone Bitart (Lortab 5/325) 1 tab TID  PRN PO PAIN Last 

administered on 5/2/18at 11:03;  Start 4/25/18 at 05:30


Al Hydroxide/Mg Hydroxide (Mylanta Plus Xs) 30 ml PRN Q4HRS  PRN PO DYSPEPSIA;  

Start 4/25/18 at 05:30


Timolol Maleate (Timoptic 0.5% Ophth) 1 drop DAILY OU  Last administered on 4/26 /18at 10:58;  Start 4/25/18 at 09:00


Vitamin D (Vitamin D3) 50,000 unit WEEKLY PO  Last administered on 4/27/18at 18:

14;  Start 4/27/18 at 09:00


Hydrochlorothiazide (Hydrodiuril) 25 mg DAILY PO  Last administered on 5/2/18at 

11:04;  Start 4/25/18 at 09:00


Loperamide HCl (Imodium) 2 mg PRN Q2HR  PRN PO DIARRHEA;  Start 4/25/18 at 05:30


Magnesium Hydroxide (Milk Of Magnesia) 2,400 mg PRN DAILY  PRN PO CONSTIPATION;

  Start 4/25/18 at 05:30


Metformin HCl (Glucophage) 1,000 mg BIDWMEALS PO  Last administered on 5/2/18at 

17:55;  Start 4/25/18 at 08:00


Potassium Chloride (Klor-Con) 10 meq BIDWMEALS PO  Last administered on 5/2/ 18at 18:05;  Start 4/25/18 at 08:00


Linagliptin (Tradjenta) 5 mg DAILY06 PO  Last administered on 5/2/18at 05:50;  

Start 4/25/18 at 06:00


Non-Formulary Medication (Solifenacin Succinate (Vesicare)) 5 mg DAILY PO ;  

Start 4/25/18 at 09:00;  Stop 4/25/18 at 09:00;  Status DC


Oxybutynin Chloride (Ditropan) 5 mg BID PO  Last administered on 5/2/18at 20:25

;  Start 4/25/18 at 09:00


Benztropine Mesylate (Cogentin) 1 mg DAILY PO  Last administered on 5/2/18at 11:

02;  Start 4/25/18 at 11:00


Olanzapine (ZyPREXA ZYDIS) 2.5 mg PRN Q2HR  PRN PO PSYCHOSIS Last administered 

on 4/25/18at 11:04;  Start 4/25/18 at 10:45;  Stop 4/25/18 at 11:26;  Status DC


Olanzapine (ZyPREXA ZYDIS) 5 mg PRN Q2HR  PRN PO PSYCHOSIS Last administered on 

5/1/18at 18:36;  Start 4/25/18 at 11:30


Lorazepam (Ativan) 0.5 mg PRN Q2HR  PRN PO ANXIETY / AGITATION Last 

administered on 5/1/18at 07:54;  Start 4/25/18 at 11:30


Valproic Acid (Depakene) 500 mg QHS PO ;  Start 4/25/18 at 21:00;  Stop 4/26/18 

at 18:46;  Status DC


Haloperidol Lactate (Haldol) 5 mg DAILY IM  Last administered on 4/27/18at 19:42

;  Start 4/26/18 at 03:15;  Stop 4/28/18 at 05:13;  Status DC


Lorazepam (Ativan) 1 mg DAILY IM  Last administered on 4/27/18at 21:12;  Start 4 /26/18 at 03:15;  Stop 4/29/18 at 18:47;  Status DC


Valproic Acid (Depakene) 500 mg QHS PO  Last administered on 4/27/18at 19:15;  

Start 4/26/18 at 21:00;  Stop 4/28/18 at 15:44;  Status DC


Trazodone HCl (Desyrel) 100 mg QHS PO  Last administered on 5/2/18at 20:25;  

Start 4/27/18 at 21:00


Trazodone HCl (Desyrel) 100 mg PRN QHS  PRN PO INSOMNIA;  Start 4/27/18 at 20:00


Haloperidol (Haldol) 10 mg DAILY PO  Last administered on 4/30/18at 10:34;  

Start 4/28/18 at 09:00;  Stop 4/30/18 at 19:17;  Status DC


Valproic Acid (Depakene) 500 mg BID PO  Last administered on 5/2/18at 20:25;  

Start 4/28/18 at 16:00


Haloperidol (Haldol) 15 mg DAILY PO  Last administered on 5/2/18at 11:02;  

Start 5/1/18 at 09:00





Active Scripts


Active


Reported


Benztropine Mesylate 1 Mg Tablet 1 Mg PO DAILY


Atorvastatin Calcium 10 Mg Tablet 10 Mg PO QHS


Amlodipine Besylate 5 Mg Tablet 5 Mg PO DAILY


Januvia (Sitagliptin Phosphate) 100 Mg Tablet 100 Mg PO DAILY06


Potassium Chloride 10 Meq Tablet.er 10 Meq PO BID


Metformin Hcl 1,000 Mg Tablet 1,000 Mg PO BID


Hydrocodone-Apap 5-325  ** (Hydrocodone Bit/Acetaminophen) 1 Each Tablet 1 Tab 

PO TID PRN


Hydrochlorothiazide Tablet (Hydrochlorothiazide) 12.5 Mg Tablet 25 Mg PO DAILY


Gabapentin 300 Mg Capsule 300 Mg PO DAILY


Ferrous Sulfate 325 Mg Tablet 325 Mg PO BID


Vitamin D2 (Ergocalciferol (Vitamin D2)) 50,000 Unit Capsule 50,000 Unit PO QFR


Vesicare (Solifenacin Succinate) 5 Mg Tablet 5 Mg PO DAILY


Timoptic (Timolol Maleate) 10 Ml Drops 1 Ml OU DAILY


Milk Of Magnesia (Magnesium Hydroxide) 2,400 Mg/10 Ml Oral.susp 2,400 Mg PO PRN 

DAILY PRN


Maalox Maximum Strength Susp (Mag Hydrox/Al Hydrox/Simeth) 355 Ml Oral.susp 30 

Ml PO PRN Q4HRS PRN


Loperamide (Loperamide Hcl) 2 Mg Tablet 2 Mg PO PRN Q2HR PRN


Bismatrol (Bismuth Subsalicylate) 262 Mg/15 Ml Oral.susp 262 Mg PO PRN Q4HRS PRN


Tylenol (Acetaminophen) 325 Mg Tablet 650 Mg PO PRN Q4HRS PRN


Zyprexa Zydis (Olanzapine) 5 Mg Tab.rapdis 2.5 Mg PO PRN Q2HR PRN


Haldol Decanoate 100 (Haloperidol Decanoate) 100 Mg/1 Ml Ampul 50 Mg IM [id number]


Invega Sustenna (Paliperidone Palmitate) 234 Mg/1.5 Ml Disp.syrin 234 Mg IM 

QMONTH


I have reviewed the current psychotropics carefully including drug 

interactions.  Risk benefit ratio favors no change other than as noted in my 

dictated progress note.





Diagnosis:


Problems:  


(1) Anxiety disorder


(2) Bipolar affective, mixed, sev w/ psych


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


(5) Dementia, vascular, with delusions


(6) Impulse control disorder


(7) Schizoaffective disorder, chronic condition with acute exacerbation


(8) Schizoaffective disorder











MARYAM HOYT MD May 2, 2018 20:57

## 2018-05-02 NOTE — PN
DATE:  04/30/2018



PSYCHIATRIC PROGRESS NOTE



This is a late entry for 04/30/2018, covers elements not covered in my initial

note of 04/30/2018.



SUBJECTIVE:  I met with the patient in the evening.  The patient slept 6 hours

previous evening, refused medications until 2:00 a.m., then took medications in

ice cream, agitated in the morning.  Still psychotic, but better, more

redirectable.



REVIEW OF SYSTEMS:  No CV, , pulmonary, eye, ENT system symptoms on review. 

Reliability poor.



MENTAL STATUS EXAM:  Oriented to herself.  Insight, judgment, recent memory is

impaired.  Language function intact.  Attention span short.  Mood and affect

remain somewhat labile, still psychotic, pushing me away as I met with her,

paranoid, suspicious.



LABORATORY DATA:  Reviewed.



IMPRESSION:  Schizoaffective disorder, bipolar type, mixed with psychotic

features.



PLAN:  Increase Haldol from 10 mg a day to 15 mg a day p.o.  Continue Depakene

at current dosage.  Repeat labs on 05/01/2018.  Adjust to reach a therapeutic

level.





______________________________

MAN CURTIS HOYT MD



DR:  ARIE/kain  JOB#:  [id number] / [id number]

DD:  05/01/2018 16:26  DT:  05/02/2018 02:57

## 2018-05-03 VITALS — SYSTOLIC BLOOD PRESSURE: 125 MMHG | DIASTOLIC BLOOD PRESSURE: 59 MMHG

## 2018-05-03 RX ADMIN — TIMOLOL MALEATE SCH DROP: 5 SOLUTION/ DROPS OPHTHALMIC at 09:00

## 2018-05-03 RX ADMIN — HALOPERIDOL SCH MG: 5 TABLET ORAL at 10:38

## 2018-05-03 RX ADMIN — POTASSIUM CHLORIDE SCH MEQ: 750 TABLET, EXTENDED RELEASE ORAL at 10:35

## 2018-05-03 RX ADMIN — ATORVASTATIN CALCIUM SCH MG: 10 TABLET, FILM COATED ORAL at 20:05

## 2018-05-03 RX ADMIN — GABAPENTIN SCH MG: 300 CAPSULE ORAL at 10:34

## 2018-05-03 RX ADMIN — BENZTROPINE MESYLATE SCH MG: 1 TABLET ORAL at 10:36

## 2018-05-03 RX ADMIN — POTASSIUM CHLORIDE SCH MEQ: 750 TABLET, EXTENDED RELEASE ORAL at 16:20

## 2018-05-03 RX ADMIN — LINAGLIPTIN SCH MG: 5 TABLET, FILM COATED ORAL at 05:44

## 2018-05-03 RX ADMIN — OXYBUTYNIN CHLORIDE SCH MG: 5 TABLET ORAL at 20:05

## 2018-05-03 RX ADMIN — OXYBUTYNIN CHLORIDE SCH MG: 5 TABLET ORAL at 10:35

## 2018-05-03 RX ADMIN — VALPROIC ACID SCH MG: 250 SOLUTION ORAL at 20:05

## 2018-05-03 RX ADMIN — Medication SCH MG: at 16:20

## 2018-05-03 RX ADMIN — TRAZODONE HYDROCHLORIDE SCH MG: 100 TABLET ORAL at 20:05

## 2018-05-03 RX ADMIN — Medication SCH MG: at 10:34

## 2018-05-03 RX ADMIN — VALPROIC ACID SCH MG: 250 SOLUTION ORAL at 10:34

## 2018-05-04 VITALS — DIASTOLIC BLOOD PRESSURE: 69 MMHG | SYSTOLIC BLOOD PRESSURE: 116 MMHG

## 2018-05-04 RX ADMIN — TRAZODONE HYDROCHLORIDE SCH MG: 100 TABLET ORAL at 19:31

## 2018-05-04 RX ADMIN — OXYBUTYNIN CHLORIDE SCH MG: 5 TABLET ORAL at 19:31

## 2018-05-04 RX ADMIN — LINAGLIPTIN SCH MG: 5 TABLET, FILM COATED ORAL at 06:05

## 2018-05-04 RX ADMIN — HYDROCODONE BITARTRATE AND ACETAMINOPHEN PRN TAB: 5; 325 TABLET ORAL at 07:49

## 2018-05-04 RX ADMIN — TIMOLOL MALEATE SCH DROP: 5 SOLUTION/ DROPS OPHTHALMIC at 07:54

## 2018-05-04 RX ADMIN — Medication SCH MG: at 07:51

## 2018-05-04 RX ADMIN — CHOLECALCIFEROL CAP 1.25 MG (50000 UNIT) SCH UNIT: 1.25 CAP at 07:53

## 2018-05-04 RX ADMIN — HALOPERIDOL SCH MG: 5 TABLET ORAL at 07:50

## 2018-05-04 RX ADMIN — HYDROCODONE BITARTRATE AND ACETAMINOPHEN PRN TAB: 5; 325 TABLET ORAL at 19:31

## 2018-05-04 RX ADMIN — Medication SCH MG: at 17:28

## 2018-05-04 RX ADMIN — VALPROIC ACID SCH MG: 250 SOLUTION ORAL at 09:00

## 2018-05-04 RX ADMIN — VALPROIC ACID SCH MG: 250 SOLUTION ORAL at 19:31

## 2018-05-04 RX ADMIN — POTASSIUM CHLORIDE SCH MEQ: 750 TABLET, EXTENDED RELEASE ORAL at 07:50

## 2018-05-04 RX ADMIN — POTASSIUM CHLORIDE SCH MEQ: 750 TABLET, EXTENDED RELEASE ORAL at 17:28

## 2018-05-04 RX ADMIN — ATORVASTATIN CALCIUM SCH MG: 10 TABLET, FILM COATED ORAL at 19:31

## 2018-05-04 RX ADMIN — BENZTROPINE MESYLATE SCH MG: 1 TABLET ORAL at 07:51

## 2018-05-04 RX ADMIN — VALPROIC ACID SCH MG: 250 SOLUTION ORAL at 07:51

## 2018-05-04 RX ADMIN — TRAZODONE HYDROCHLORIDE SCH MG: 150 TABLET ORAL at 21:00

## 2018-05-04 RX ADMIN — TIMOLOL MALEATE SCH DROP: 5 SOLUTION/ DROPS OPHTHALMIC at 09:00

## 2018-05-04 RX ADMIN — TRAZODONE HYDROCHLORIDE SCH MG: 150 TABLET ORAL at 23:29

## 2018-05-04 RX ADMIN — OXYBUTYNIN CHLORIDE SCH MG: 5 TABLET ORAL at 07:50

## 2018-05-04 RX ADMIN — GABAPENTIN SCH MG: 300 CAPSULE ORAL at 07:51

## 2018-05-04 NOTE — PDOC
Exam


Note:


Javier Note:


Please also refer to the separate dictated note~for this date of service 

dictated separately.~Patient seen individually. Discussed the patient with 

Nursing staff reviewed the chart.~Reviewed interim history and current 

functioning. Reviewed vital signs,~Labs/ Radiology~and current medications 

noted below. Continue current treatment with the changes noted in the dictated 

addendum note





Assessment:


Vital Signs:





 Vital Signs








  Date Time  Temp Pulse Resp B/P (MAP) Pulse Ox O2 Delivery O2 Flow Rate FiO2


 


5/4/18 19:31   22     


 


5/4/18 15:55 97.0 83  116/69 (85) 94   


 


5/2/18 13:00      Room Air  








I&O











Intake and Output 


 


 5/4/18





 07:00


 


Intake Total 600 ml


 


Balance 600 ml


 


 


 


Intake Oral 600 ml








Labs:





 Laboratory Tests








Test


  5/4/18


07:46


 


Glucose (Fingerstick)


  94 mg/dL


(70-99)











Current Medications:


Meds:





Current Medications


Acetaminophen (Tylenol) 650 mg PRN Q6HRS  PRN PO PAIN / TEMP;  Start 4/25/18 at 

05:00;  Stop 4/26/18 at 16:16;  Status DC


Multi-Ingredient Ointment (Analgesic Balm) 1 edin PRN QID  PRN TP MUSCLE PAIN;  

Start 4/25/18 at 05:00


Al Hydroxide/Mg Hydroxide (Mylanta Plus Xs) 15 ml PRN AFTMEALHC  PRN PO 

DYSPEPSIA;  Start 4/25/18 at 05:00;  Stop 4/26/18 at 16:16;  Status DC


Magnesium Hydroxide (Milk Of Magnesia) 2,400 mg PRN QHS  PRN PO CONSTIPATION;  

Start 4/25/18 at 05:00;  Stop 4/26/18 at 16:16;  Status DC


Acetaminophen (Tylenol) 650 mg PRN Q4HRS  PRN PO PAIN / TEMP;  Start 4/25/18 at 

05:30


Amlodipine Besylate (Norvasc) 5 mg DAILY PO  Last administered on 5/4/18at 07:50

;  Start 4/25/18 at 09:00


Atorvastatin Calcium (Lipitor) 10 mg QHS PO  Last administered on 5/4/18at 19:31

;  Start 4/25/18 at 21:00


Bismuth Subsalicylate (Pepto-Bismol) 262 mg PRN Q4HRS  PRN PO DYSPEPSIA;  Start 

4/25/18 at 05:30


Ferrous Sulfate (Feosol) 325 mg BIDAFTMEAL PO  Last administered on 5/4/18at 17:

28;  Start 4/25/18 at 09:00


Gabapentin (Neurontin) 300 mg DAILY PO  Last administered on 5/4/18at 07:51;  

Start 4/25/18 at 09:00


Acetaminophen/ Hydrocodone Bitart (Lortab 5/325) 1 tab TID  PRN PO PAIN Last 

administered on 5/4/18at 19:31;  Start 4/25/18 at 05:30


Al Hydroxide/Mg Hydroxide (Mylanta Plus Xs) 30 ml PRN Q4HRS  PRN PO DYSPEPSIA;  

Start 4/25/18 at 05:30


Timolol Maleate (Timoptic 0.5% Ophth) 1 drop DAILY OU  Last administered on 4/26 /18at 10:58;  Start 4/25/18 at 09:00


Vitamin D (Vitamin D3) 50,000 unit WEEKLY PO  Last administered on 5/4/18at 07:

53;  Start 4/27/18 at 09:00


Hydrochlorothiazide (Hydrodiuril) 25 mg DAILY PO  Last administered on 5/4/18at 

07:49;  Start 4/25/18 at 09:00


Loperamide HCl (Imodium) 2 mg PRN Q2HR  PRN PO DIARRHEA;  Start 4/25/18 at 05:30


Magnesium Hydroxide (Milk Of Magnesia) 2,400 mg PRN DAILY  PRN PO CONSTIPATION;

  Start 4/25/18 at 05:30


Metformin HCl (Glucophage) 1,000 mg BIDWMEALS PO  Last administered on 5/4/18at 

17:27;  Start 4/25/18 at 08:00


Potassium Chloride (Klor-Con) 10 meq BIDWMEALS PO  Last administered on 5/4/ 18at 17:28;  Start 4/25/18 at 08:00


Linagliptin (Tradjenta) 5 mg DAILY06 PO  Last administered on 5/4/18at 06:05;  

Start 4/25/18 at 06:00


Non-Formulary Medication (Solifenacin Succinate (Vesicare)) 5 mg DAILY PO ;  

Start 4/25/18 at 09:00;  Stop 4/25/18 at 09:00;  Status DC


Oxybutynin Chloride (Ditropan) 5 mg BID PO  Last administered on 5/4/18at 19:31

;  Start 4/25/18 at 09:00


Benztropine Mesylate (Cogentin) 1 mg DAILY PO  Last administered on 5/4/18at 07:

51;  Start 4/25/18 at 11:00


Olanzapine (ZyPREXA ZYDIS) 2.5 mg PRN Q2HR  PRN PO PSYCHOSIS Last administered 

on 4/25/18at 11:04;  Start 4/25/18 at 10:45;  Stop 4/25/18 at 11:26;  Status DC


Olanzapine (ZyPREXA ZYDIS) 5 mg PRN Q2HR  PRN PO PSYCHOSIS Last administered on 

5/4/18at 11:00;  Start 4/25/18 at 11:30


Lorazepam (Ativan) 0.5 mg PRN Q2HR  PRN PO ANXIETY / AGITATION Last 

administered on 5/1/18at 07:54;  Start 4/25/18 at 11:30


Valproic Acid (Depakene) 500 mg QHS PO ;  Start 4/25/18 at 21:00;  Stop 4/26/18 

at 18:46;  Status DC


Haloperidol Lactate (Haldol) 5 mg DAILY IM  Last administered on 4/27/18at 19:42

;  Start 4/26/18 at 03:15;  Stop 4/28/18 at 05:13;  Status DC


Lorazepam (Ativan) 1 mg DAILY IM  Last administered on 4/27/18at 21:12;  Start 4 /26/18 at 03:15;  Stop 4/29/18 at 18:47;  Status DC


Valproic Acid (Depakene) 500 mg QHS PO  Last administered on 4/27/18at 19:15;  

Start 4/26/18 at 21:00;  Stop 4/28/18 at 15:44;  Status DC


Trazodone HCl (Desyrel) 100 mg QHS PO  Last administered on 5/4/18at 19:31;  

Start 4/27/18 at 21:00;  Stop 5/4/18 at 19:37;  Status DC


Trazodone HCl (Desyrel) 100 mg PRN QHS  PRN PO INSOMNIA;  Start 4/27/18 at 20:00


Haloperidol (Haldol) 10 mg DAILY PO  Last administered on 4/30/18at 10:34;  

Start 4/28/18 at 09:00;  Stop 4/30/18 at 19:17;  Status DC


Valproic Acid (Depakene) 500 mg BID PO  Last administered on 5/4/18at 19:31;  

Start 4/28/18 at 16:00


Haloperidol (Haldol) 15 mg DAILY PO  Last administered on 5/4/18at 07:50;  

Start 5/1/18 at 09:00;  Stop 5/7/18 at 10:00


Haloperidol Decanoate (Haldol Decanoate Im Extended Release) 100 mg 1X  ONCE IM

  Last administered on 5/3/18at 15:09;  Start 5/3/18 at 13:30;  Stop 5/3/18 at 

13:34;  Status DC


Haloperidol (Haldol) 10 mg DAILY PO ;  Start 5/8/18 at 09:00


Trazodone HCl (Desyrel) 150 mg QHS PO ;  Start 5/4/18 at 21:00





Active Scripts


Active


Reported


Benztropine Mesylate 1 Mg Tablet 1 Mg PO DAILY


Atorvastatin Calcium 10 Mg Tablet 10 Mg PO QHS


Amlodipine Besylate 5 Mg Tablet 5 Mg PO DAILY


Januvia (Sitagliptin Phosphate) 100 Mg Tablet 100 Mg PO DAILY06


Potassium Chloride 10 Meq Tablet.er 10 Meq PO BID


Metformin Hcl 1,000 Mg Tablet 1,000 Mg PO BID


Hydrocodone-Apap 5-325  ** (Hydrocodone Bit/Acetaminophen) 1 Each Tablet 1 Tab 

PO TID PRN


Hydrochlorothiazide Tablet (Hydrochlorothiazide) 12.5 Mg Tablet 25 Mg PO DAILY


Gabapentin 300 Mg Capsule 300 Mg PO DAILY


Ferrous Sulfate 325 Mg Tablet 325 Mg PO BID


Vitamin D2 (Ergocalciferol (Vitamin D2)) 50,000 Unit Capsule 50,000 Unit PO QFR


Vesicare (Solifenacin Succinate) 5 Mg Tablet 5 Mg PO DAILY


Timoptic (Timolol Maleate) 10 Ml Drops 1 Ml OU DAILY


Milk Of Magnesia (Magnesium Hydroxide) 2,400 Mg/10 Ml Oral.susp 2,400 Mg PO PRN 

DAILY PRN


Maalox Maximum Strength Susp (Mag Hydrox/Al Hydrox/Simeth) 355 Ml Oral.susp 30 

Ml PO PRN Q4HRS PRN


Loperamide (Loperamide Hcl) 2 Mg Tablet 2 Mg PO PRN Q2HR PRN


Bismatrol (Bismuth Subsalicylate) 262 Mg/15 Ml Oral.susp 262 Mg PO PRN Q4HRS PRN


Tylenol (Acetaminophen) 325 Mg Tablet 650 Mg PO PRN Q4HRS PRN


Zyprexa Zydis (Olanzapine) 5 Mg Tab.rapdis 2.5 Mg PO PRN Q2HR PRN


Haldol Decanoate 100 (Haloperidol Decanoate) 100 Mg/1 Ml Ampul 50 Mg IM [id number]


Invega Sustenna (Paliperidone Palmitate) 234 Mg/1.5 Ml Disp.syrin 234 Mg IM 

QMONTH


I have reviewed the current psychotropics carefully including drug 

interactions.  Risk benefit ratio favors no change other than as noted in my 

dictated progress note.





Diagnosis:


Problems:  


(1) Anxiety disorder


(2) Bipolar affective, mixed, sev w/ psych


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


(5) Dementia, vascular, with delusions


(6) Impulse control disorder


(7) Schizoaffective disorder, chronic condition with acute exacerbation


(8) Schizoaffective disorder











MARYAM HOYT MD May 4, 2018 21:05

## 2018-05-05 VITALS — DIASTOLIC BLOOD PRESSURE: 66 MMHG | SYSTOLIC BLOOD PRESSURE: 122 MMHG

## 2018-05-05 VITALS — DIASTOLIC BLOOD PRESSURE: 76 MMHG | SYSTOLIC BLOOD PRESSURE: 109 MMHG

## 2018-05-05 LAB
ALBUMIN SERPL-MCNC: 3.6 G/DL (ref 3.4–5)
ALBUMIN/GLOB SERPL: 0.9 {RATIO} (ref 1–1.7)
ALP SERPL-CCNC: 91 U/L (ref 46–116)
ALT SERPL-CCNC: 21 U/L (ref 14–59)
ANION GAP SERPL CALC-SCNC: 9 MMOL/L (ref 6–14)
AST SERPL-CCNC: 13 U/L (ref 15–37)
BASOPHILS # BLD AUTO: 0 X10^3/UL (ref 0–0.2)
BASOPHILS NFR BLD: 1 % (ref 0–3)
BILIRUB SERPL-MCNC: 0.2 MG/DL (ref 0.2–1)
BUN/CREAT SERPL: 13 (ref 6–20)
CA-I SERPL ISE-MCNC: 12 MG/DL (ref 7–20)
CALCIUM SERPL-MCNC: 9.1 MG/DL (ref 8.5–10.1)
CHLORIDE SERPL-SCNC: 100 MMOL/L (ref 98–107)
CO2 SERPL-SCNC: 31 MMOL/L (ref 21–32)
CREAT SERPL-MCNC: 0.9 MG/DL (ref 0.6–1)
EOSINOPHIL NFR BLD: 0.1 X10^3/UL (ref 0–0.7)
EOSINOPHIL NFR BLD: 2 % (ref 0–3)
ERYTHROCYTE [DISTWIDTH] IN BLOOD BY AUTOMATED COUNT: 15 % (ref 11.5–14.5)
GFR SERPLBLD BASED ON 1.73 SQ M-ARVRAT: 63.2 ML/MIN
GLOBULIN SER-MCNC: 3.9 G/DL (ref 2.2–3.8)
GLUCOSE SERPL-MCNC: 139 MG/DL (ref 70–99)
HCT VFR BLD CALC: 40.5 % (ref 36–47)
HGB BLD-MCNC: 13.3 G/DL (ref 12–15.5)
LYMPHOCYTES # BLD: 1.7 X10^3/UL (ref 1–4.8)
LYMPHOCYTES NFR BLD AUTO: 31 % (ref 24–48)
MCH RBC QN AUTO: 28 PG (ref 25–35)
MCHC RBC AUTO-ENTMCNC: 33 G/DL (ref 31–37)
MCV RBC AUTO: 85 FL (ref 79–100)
MONO #: 0.4 X10^3/UL (ref 0–1.1)
MONOCYTES NFR BLD: 7 % (ref 0–9)
NEUT #: 3.4 X10^3UL (ref 1.8–7.7)
NEUTROPHILS NFR BLD AUTO: 60 % (ref 31–73)
PLATELET # BLD AUTO: 259 X10^3/UL (ref 140–400)
POTASSIUM SERPL-SCNC: 3.4 MMOL/L (ref 3.5–5.1)
PROT SERPL-MCNC: 7.5 G/DL (ref 6.4–8.2)
RBC # BLD AUTO: 4.75 X10^6/UL (ref 3.5–5.4)
SODIUM SERPL-SCNC: 140 MMOL/L (ref 136–145)
WBC # BLD AUTO: 5.6 X10^3/UL (ref 4–11)

## 2018-05-05 RX ADMIN — LINAGLIPTIN SCH MG: 5 TABLET, FILM COATED ORAL at 05:32

## 2018-05-05 RX ADMIN — OXYBUTYNIN CHLORIDE SCH MG: 5 TABLET ORAL at 19:46

## 2018-05-05 RX ADMIN — HYDROCODONE BITARTRATE AND ACETAMINOPHEN PRN TAB: 5; 325 TABLET ORAL at 07:49

## 2018-05-05 RX ADMIN — OXYBUTYNIN CHLORIDE SCH MG: 5 TABLET ORAL at 07:50

## 2018-05-05 RX ADMIN — TIMOLOL MALEATE SCH DROP: 5 SOLUTION/ DROPS OPHTHALMIC at 07:51

## 2018-05-05 RX ADMIN — BENZTROPINE MESYLATE SCH MG: 1 TABLET ORAL at 07:49

## 2018-05-05 RX ADMIN — GABAPENTIN SCH MG: 300 CAPSULE ORAL at 07:49

## 2018-05-05 RX ADMIN — Medication SCH MG: at 07:49

## 2018-05-05 RX ADMIN — Medication SCH MG: at 17:43

## 2018-05-05 RX ADMIN — VALPROIC ACID SCH MG: 250 SOLUTION ORAL at 19:45

## 2018-05-05 RX ADMIN — HALOPERIDOL SCH MG: 5 TABLET ORAL at 07:48

## 2018-05-05 RX ADMIN — ATORVASTATIN CALCIUM SCH MG: 10 TABLET, FILM COATED ORAL at 19:46

## 2018-05-05 RX ADMIN — VALPROIC ACID SCH MG: 250 SOLUTION ORAL at 07:50

## 2018-05-05 RX ADMIN — POTASSIUM CHLORIDE SCH MEQ: 750 TABLET, EXTENDED RELEASE ORAL at 07:49

## 2018-05-05 RX ADMIN — TRAZODONE HYDROCHLORIDE SCH MG: 100 TABLET ORAL at 19:50

## 2018-05-05 NOTE — PN
DATE:  05/03/2018



This is a late entry 05/03, covers elements not covered in my initial note.



SUBJECTIVE:  The patient was staffed at treatment team meeting with the entire

team in the morning, seen individually in the evening.  She remains quite

disorganized, labile in her mood, psychotic, even though overall is better than

before.  Oral psychotropics are relatively ineffective when she is

intermittently noncompliant.



REVIEW OF SYSTEMS:  No CV, , pulmonary, eye, ENT system symptoms on review.



MENTAL STATUS EXAM:  Oriented to herself and situation.  Speech coherent,

pressured.  Abstraction fair, computation impaired, language function intact,

still somewhat paranoid, delusional, takes her medications and milk and ice

cream.



IMPRESSION:  Unchanged from initial note.



PLAN:  Start Haldol Decanoate 100 mg IM q. monthly and may add another 50 mg

dosage.  Continue oral psychotropics, Depakene, Haldol and we will gradually

reduce the oral over time.





______________________________

MARYAM HOYT MD



DR:  ARIE/kain  JOB#:  [id number] / [id number]

DD:  05/04/2018 17:30  DT:  05/05/2018 17:29

## 2018-05-05 NOTE — PN
DATE:  05/02/2018



PSYCHIATRIC PROGRESS NOTE



This late entry 05/02/2018 covers elements not covered in my initial note

05/02/2018.



SUBJECTIVE:  I met with the patient in the evening.  The patient slept 8-3/4

hours previous evening, had a better day, still psychotic, disorganized, but

less so than before.  No PRNs were noted.  At times, she refuses her

psychotropics.



REVIEW OF SYSTEMS:  No CV, , pulmonary, eye, ENT system symptoms on review. 

Reliability poor.



MENTAL STATUS EXAM:  Oriented to herself and at times situation.  Speech is

coherent, rapid at times.  Abstraction fair, computation impaired, language

function intact, attention span short.  Mood and affect still labile, but

improved.



LABORATORIES:  Reviewed.



IMPRESSION:  Schizoaffective disorder, bipolar type, mixed with psychotic

features.



PLAN:  Continue current psychotropics.  Encourage compliance with oral

medications, may consider changing the oral Haldol to Haldol decanoate.





______________________________

MARYAM HOYT MD



DR:  ARIE/kain  JOB#:  [id number] / [id number]

DD:  05/04/2018 16:44  DT:  05/05/2018 04:28

## 2018-05-05 NOTE — PDOC
Exam


Note:


Javier Note:


Please also refer to the separate dictated note~for this date of service 

dictated separately.~Patient seen individually. Discussed the patient with 

Nursing staff reviewed the chart.~Reviewed interim history and current 

functioning. Reviewed vital signs,~Labs/ Radiology~and current medications 

noted below. Continue current treatment with the changes noted in the dictated 

addendum note





Assessment:


Vital Signs:





 Vital Signs








  Date Time  Temp Pulse Resp B/P (MAP) Pulse Ox O2 Delivery O2 Flow Rate FiO2


 


5/5/18 21:16     97   


 


5/5/18 16:54 96.2 75 18 109/76 (87)    


 


5/2/18 13:00      Room Air  








I&O











Intake and Output 


 


 5/5/18





 07:00


 


Intake Total 720 ml


 


Balance 720 ml


 


 


 


Intake Oral 720 ml








Labs:





 Laboratory Tests








Test


  5/5/18


07:50 5/5/18


10:17


 


Glucose (Fingerstick)


  125 mg/dL


(70-99)  H 


 


 


White Blood Count


  


  5.6 x10^3/uL


(4.0-11.0)


 


Red Blood Count


  


  4.75 x10^6/uL


(3.50-5.40)


 


Hemoglobin


  


  13.3 g/dL


(12.0-15.5)


 


Hematocrit


  


  40.5 %


(36.0-47.0)


 


Mean Corpuscular Volume


  


  85 fL ()


 


 


Mean Corpuscular Hemoglobin  28 pg (25-35)  


 


Mean Corpuscular Hemoglobin


Concent 


  33 g/dL


(31-37)


 


Red Cell Distribution Width


  


  15.0 %


(11.5-14.5)  H


 


Platelet Count


  


  259 x10^3/uL


(140-400)


 


Neutrophils (%) (Auto)  60 % (31-73)  


 


Lymphocytes (%) (Auto)  31 % (24-48)  


 


Monocytes (%) (Auto)  7 % (0-9)  


 


Eosinophils (%) (Auto)  2 % (0-3)  


 


Basophils (%) (Auto)  1 % (0-3)  


 


Neutrophils # (Auto)


  


  3.4 x10^3uL


(1.8-7.7)


 


Lymphocytes # (Auto)


  


  1.7 x10^3/uL


(1.0-4.8)


 


Monocytes # (Auto)


  


  0.4 x10^3/uL


(0.0-1.1)


 


Eosinophils # (Auto)


  


  0.1 x10^3/uL


(0.0-0.7)


 


Basophils # (Auto)


  


  0.0 x10^3/uL


(0.0-0.2)


 


Sodium Level


  


  140 mmol/L


(136-145)


 


Potassium Level


  


  3.4 mmol/L


(3.5-5.1)  L


 


Chloride Level


  


  100 mmol/L


()


 


Carbon Dioxide Level


  


  31 mmol/L


(21-32)


 


Anion Gap  9 (6-14)  


 


Blood Urea Nitrogen


  


  12 mg/dL


(7-20)


 


Creatinine


  


  0.9 mg/dL


(0.6-1.0)


 


Estimated GFR


(Cockcroft-Gault) 


  63.2  


 


 


BUN/Creatinine Ratio  13 (6-20)  


 


Glucose Level


  


  139 mg/dL


(70-99)  H


 


Calcium Level


  


  9.1 mg/dL


(8.5-10.1)


 


Total Bilirubin


  


  0.2 mg/dL


(0.2-1.0)


 


Aspartate Amino Transferase


(AST) 


  13 U/L (15-37)


L


 


Alanine Aminotransferase (ALT)


  


  21 U/L (14-59)


 


 


Alkaline Phosphatase


  


  91 U/L


()


 


Total Protein


  


  7.5 g/dL


(6.4-8.2)


 


Albumin


  


  3.6 g/dL


(3.4-5.0)


 


Albumin/Globulin Ratio


  


  0.9 (1.0-1.7)


L











Current Medications:


Meds:





Current Medications


Acetaminophen (Tylenol) 650 mg PRN Q6HRS  PRN PO PAIN / TEMP;  Start 4/25/18 at 

05:00;  Stop 4/26/18 at 16:16;  Status DC


Multi-Ingredient Ointment (Analgesic Balm) 1 edin PRN QID  PRN TP MUSCLE PAIN;  

Start 4/25/18 at 05:00


Al Hydroxide/Mg Hydroxide (Mylanta Plus Xs) 15 ml PRN AFTMEALHC  PRN PO 

DYSPEPSIA;  Start 4/25/18 at 05:00;  Stop 4/26/18 at 16:16;  Status DC


Magnesium Hydroxide (Milk Of Magnesia) 2,400 mg PRN QHS  PRN PO CONSTIPATION;  

Start 4/25/18 at 05:00;  Stop 4/26/18 at 16:16;  Status DC


Acetaminophen (Tylenol) 650 mg PRN Q4HRS  PRN PO PAIN / TEMP;  Start 4/25/18 at 

05:30


Amlodipine Besylate (Norvasc) 5 mg DAILY PO  Last administered on 5/5/18at 07:49

;  Start 4/25/18 at 09:00


Atorvastatin Calcium (Lipitor) 10 mg QHS PO  Last administered on 5/5/18at 19:46

;  Start 4/25/18 at 21:00


Bismuth Subsalicylate (Pepto-Bismol) 262 mg PRN Q4HRS  PRN PO DYSPEPSIA;  Start 

4/25/18 at 05:30


Ferrous Sulfate (Feosol) 325 mg BIDAFTMEAL PO  Last administered on 5/5/18at 17:

43;  Start 4/25/18 at 09:00


Gabapentin (Neurontin) 300 mg DAILY PO  Last administered on 5/5/18at 07:49;  

Start 4/25/18 at 09:00


Acetaminophen/ Hydrocodone Bitart (Lortab 5/325) 1 tab TID  PRN PO PAIN Last 

administered on 5/5/18at 07:49;  Start 4/25/18 at 05:30


Al Hydroxide/Mg Hydroxide (Mylanta Plus Xs) 30 ml PRN Q4HRS  PRN PO DYSPEPSIA;  

Start 4/25/18 at 05:30


Timolol Maleate (Timoptic 0.5% Ophth) 1 drop DAILY OU  Last administered on 4/26 /18at 10:58;  Start 4/25/18 at 09:00


Vitamin D (Vitamin D3) 50,000 unit WEEKLY PO  Last administered on 5/4/18at 07:

53;  Start 4/27/18 at 09:00


Hydrochlorothiazide (Hydrodiuril) 25 mg DAILY PO  Last administered on 5/5/18at 

07:50;  Start 4/25/18 at 09:00


Loperamide HCl (Imodium) 2 mg PRN Q2HR  PRN PO DIARRHEA;  Start 4/25/18 at 05:30


Magnesium Hydroxide (Milk Of Magnesia) 2,400 mg PRN DAILY  PRN PO CONSTIPATION;

  Start 4/25/18 at 05:30


Metformin HCl (Glucophage) 1,000 mg BIDWMEALS PO  Last administered on 5/5/18at 

17:00;  Start 4/25/18 at 08:00


Potassium Chloride (Klor-Con) 10 meq BIDWMEALS PO  Last administered on 5/5/ 18at 07:49;  Start 4/25/18 at 08:00;  Stop 5/5/18 at 15:21;  Status DC


Linagliptin (Tradjenta) 5 mg DAILY06 PO  Last administered on 5/5/18at 05:32;  

Start 4/25/18 at 06:00


Non-Formulary Medication (Solifenacin Succinate (Vesicare)) 5 mg DAILY PO ;  

Start 4/25/18 at 09:00;  Stop 4/25/18 at 09:00;  Status DC


Oxybutynin Chloride (Ditropan) 5 mg BID PO  Last administered on 5/5/18at 19:46

;  Start 4/25/18 at 09:00


Benztropine Mesylate (Cogentin) 1 mg DAILY PO  Last administered on 5/5/18at 07:

49;  Start 4/25/18 at 11:00


Olanzapine (ZyPREXA ZYDIS) 2.5 mg PRN Q2HR  PRN PO PSYCHOSIS Last administered 

on 4/25/18at 11:04;  Start 4/25/18 at 10:45;  Stop 4/25/18 at 11:26;  Status DC


Olanzapine (ZyPREXA ZYDIS) 5 mg PRN Q2HR  PRN PO PSYCHOSIS Last administered on 

5/5/18at 17:43;  Start 4/25/18 at 11:30


Lorazepam (Ativan) 0.5 mg PRN Q2HR  PRN PO ANXIETY / AGITATION Last 

administered on 5/1/18at 07:54;  Start 4/25/18 at 11:30


Valproic Acid (Depakene) 500 mg QHS PO ;  Start 4/25/18 at 21:00;  Stop 4/26/18 

at 18:46;  Status DC


Haloperidol Lactate (Haldol) 5 mg DAILY IM  Last administered on 4/27/18at 19:42

;  Start 4/26/18 at 03:15;  Stop 4/28/18 at 05:13;  Status DC


Lorazepam (Ativan) 1 mg DAILY IM  Last administered on 4/27/18at 21:12;  Start 4 /26/18 at 03:15;  Stop 4/29/18 at 18:47;  Status DC


Valproic Acid (Depakene) 500 mg QHS PO  Last administered on 4/27/18at 19:15;  

Start 4/26/18 at 21:00;  Stop 4/28/18 at 15:44;  Status DC


Trazodone HCl (Desyrel) 100 mg QHS PO  Last administered on 5/4/18at 19:31;  

Start 4/27/18 at 21:00;  Stop 5/4/18 at 19:37;  Status DC


Trazodone HCl (Desyrel) 100 mg PRN QHS  PRN PO INSOMNIA;  Start 4/27/18 at 20:00


Haloperidol (Haldol) 10 mg DAILY PO  Last administered on 4/30/18at 10:34;  

Start 4/28/18 at 09:00;  Stop 4/30/18 at 19:17;  Status DC


Valproic Acid (Depakene) 500 mg BID PO  Last administered on 5/5/18at 19:45;  

Start 4/28/18 at 16:00


Haloperidol (Haldol) 15 mg DAILY PO  Last administered on 5/5/18at 07:48;  

Start 5/1/18 at 09:00;  Stop 5/7/18 at 10:00


Haloperidol Decanoate (Haldol Decanoate Im Extended Release) 100 mg 1X  ONCE IM

  Last administered on 5/3/18at 15:09;  Start 5/3/18 at 13:30;  Stop 5/3/18 at 

13:34;  Status DC


Haloperidol (Haldol) 10 mg DAILY PO ;  Start 5/8/18 at 09:00


Trazodone HCl (Desyrel) 150 mg QHS PO  Last administered on 5/4/18at 23:29;  

Start 5/4/18 at 21:00;  Stop 5/5/18 at 18:00;  Status DC


Trazodone HCl (Desyrel) 50 mg 1X  ONCE PO  Last administered on 5/4/18at 21:38;

  Start 5/4/18 at 21:30;  Stop 5/4/18 at 21:31;  Status DC


Potassium Chloride (Klor-Con) 20 meq BIDWMEALS PO ;  Start 5/6/18 at 17:00


Trazodone HCl (Desyrel) 100 mg QHS PO ;  Start 5/5/18 at 18:15;  Stop 5/5/18 at 

18:15;  Status DC


Trazodone HCl (Desyrel) 100 mg QHS PO  Last administered on 5/5/18at 19:50;  

Start 5/5/18 at 21:00





Active Scripts


Active


Reported


Benztropine Mesylate 1 Mg Tablet 1 Mg PO DAILY


Atorvastatin Calcium 10 Mg Tablet 10 Mg PO QHS


Amlodipine Besylate 5 Mg Tablet 5 Mg PO DAILY


Januvia (Sitagliptin Phosphate) 100 Mg Tablet 100 Mg PO DAILY06


Potassium Chloride 10 Meq Tablet.er 10 Meq PO BID


Metformin Hcl 1,000 Mg Tablet 1,000 Mg PO BID


Hydrocodone-Apap 5-325  ** (Hydrocodone Bit/Acetaminophen) 1 Each Tablet 1 Tab 

PO TID PRN


Hydrochlorothiazide Tablet (Hydrochlorothiazide) 12.5 Mg Tablet 25 Mg PO DAILY


Gabapentin 300 Mg Capsule 300 Mg PO DAILY


Ferrous Sulfate 325 Mg Tablet 325 Mg PO BID


Vitamin D2 (Ergocalciferol (Vitamin D2)) 50,000 Unit Capsule 50,000 Unit PO QFR


Vesicare (Solifenacin Succinate) 5 Mg Tablet 5 Mg PO DAILY


Timoptic (Timolol Maleate) 10 Ml Drops 1 Ml OU DAILY


Milk Of Magnesia (Magnesium Hydroxide) 2,400 Mg/10 Ml Oral.susp 2,400 Mg PO PRN 

DAILY PRN


Maalox Maximum Strength Susp (Mag Hydrox/Al Hydrox/Simeth) 355 Ml Oral.susp 30 

Ml PO PRN Q4HRS PRN


Loperamide (Loperamide Hcl) 2 Mg Tablet 2 Mg PO PRN Q2HR PRN


Bismatrol (Bismuth Subsalicylate) 262 Mg/15 Ml Oral.susp 262 Mg PO PRN Q4HRS PRN


Tylenol (Acetaminophen) 325 Mg Tablet 650 Mg PO PRN Q4HRS PRN


Zyprexa Zydis (Olanzapine) 5 Mg Tab.rapdis 2.5 Mg PO PRN Q2HR PRN


Haldol Decanoate 100 (Haloperidol Decanoate) 100 Mg/1 Ml Ampul 50 Mg IM [id number]


Invega Sustenna (Paliperidone Palmitate) 234 Mg/1.5 Ml Disp.syrin 234 Mg IM 

QMONTH


I have reviewed the current psychotropics carefully including drug 

interactions.  Risk benefit ratio favors no change other than as noted in my 

dictated progress note.





Diagnosis:


Problems:  


(1) Anxiety disorder


(2) Bipolar affective, mixed, sev w/ psych


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


(5) Dementia, vascular, with delusions


(6) Impulse control disorder


(7) Schizoaffective disorder, chronic condition with acute exacerbation


(8) Schizoaffective disorder











MARYAM HOYT MD May 5, 2018 23:02

## 2018-05-06 VITALS — DIASTOLIC BLOOD PRESSURE: 61 MMHG | SYSTOLIC BLOOD PRESSURE: 113 MMHG

## 2018-05-06 VITALS — DIASTOLIC BLOOD PRESSURE: 75 MMHG | SYSTOLIC BLOOD PRESSURE: 121 MMHG

## 2018-05-06 RX ADMIN — BENZTROPINE MESYLATE SCH MG: 1 TABLET ORAL at 07:59

## 2018-05-06 RX ADMIN — Medication SCH MG: at 07:59

## 2018-05-06 RX ADMIN — POTASSIUM CHLORIDE SCH MEQ: 1500 TABLET, EXTENDED RELEASE ORAL at 17:00

## 2018-05-06 RX ADMIN — GABAPENTIN SCH MG: 300 CAPSULE ORAL at 07:59

## 2018-05-06 RX ADMIN — OXYBUTYNIN CHLORIDE SCH MG: 5 TABLET ORAL at 07:59

## 2018-05-06 RX ADMIN — VALPROIC ACID SCH MG: 250 SOLUTION ORAL at 07:59

## 2018-05-06 RX ADMIN — TIMOLOL MALEATE SCH DROP: 5 SOLUTION/ DROPS OPHTHALMIC at 08:00

## 2018-05-06 RX ADMIN — LINAGLIPTIN SCH MG: 5 TABLET, FILM COATED ORAL at 06:09

## 2018-05-06 RX ADMIN — TRAZODONE HYDROCHLORIDE SCH MG: 100 TABLET ORAL at 20:02

## 2018-05-06 RX ADMIN — ATORVASTATIN CALCIUM SCH MG: 10 TABLET, FILM COATED ORAL at 20:02

## 2018-05-06 RX ADMIN — OXYBUTYNIN CHLORIDE SCH MG: 5 TABLET ORAL at 20:02

## 2018-05-06 RX ADMIN — VALPROIC ACID SCH MG: 250 SOLUTION ORAL at 20:02

## 2018-05-06 RX ADMIN — HALOPERIDOL SCH MG: 5 TABLET ORAL at 07:59

## 2018-05-06 RX ADMIN — HYDROCODONE BITARTRATE AND ACETAMINOPHEN PRN TAB: 5; 325 TABLET ORAL at 20:02

## 2018-05-06 RX ADMIN — Medication SCH MG: at 18:00

## 2018-05-06 NOTE — PDOC
Exam


Note:


Javier Note:


Please also refer to the separate dictated note~for this date of service 

dictated separately.~Patient seen individually. Discussed the patient with 

Nursing staff reviewed the chart.~Reviewed interim history and current 

functioning. Reviewed vital signs,~Labs/ Radiology~and current medications 

noted below. Continue current treatment with the changes noted in the dictated 

addendum note





Assessment:


Vital Signs:





 Vital Signs








  Date Time  Temp Pulse Resp B/P (MAP) Pulse Ox O2 Delivery O2 Flow Rate FiO2


 


5/6/18 16:39 97.2 87 18 121/75 (90) 94   


 


5/2/18 13:00      Room Air  








I&O











Intake and Output 


 


 5/6/18





 07:00


 


Intake Total 240 ml


 


Balance 240 ml


 


 


 


Intake Oral 240 ml








Labs:





 Laboratory Tests








Test


  5/6/18


07:42


 


Glucose (Fingerstick)


  91 mg/dL


(70-99)











Current Medications:


Meds:





Current Medications


Acetaminophen (Tylenol) 650 mg PRN Q6HRS  PRN PO PAIN / TEMP;  Start 4/25/18 at 

05:00;  Stop 4/26/18 at 16:16;  Status DC


Multi-Ingredient Ointment (Analgesic Balm) 1 edin PRN QID  PRN TP MUSCLE PAIN;  

Start 4/25/18 at 05:00


Al Hydroxide/Mg Hydroxide (Mylanta Plus Xs) 15 ml PRN AFTMEALHC  PRN PO 

DYSPEPSIA;  Start 4/25/18 at 05:00;  Stop 4/26/18 at 16:16;  Status DC


Magnesium Hydroxide (Milk Of Magnesia) 2,400 mg PRN QHS  PRN PO CONSTIPATION;  

Start 4/25/18 at 05:00;  Stop 4/26/18 at 16:16;  Status DC


Acetaminophen (Tylenol) 650 mg PRN Q4HRS  PRN PO PAIN / TEMP;  Start 4/25/18 at 

05:30


Amlodipine Besylate (Norvasc) 5 mg DAILY PO  Last administered on 5/6/18at 07:59

;  Start 4/25/18 at 09:00


Atorvastatin Calcium (Lipitor) 10 mg QHS PO  Last administered on 5/5/18at 19:46

;  Start 4/25/18 at 21:00


Bismuth Subsalicylate (Pepto-Bismol) 262 mg PRN Q4HRS  PRN PO DYSPEPSIA;  Start 

4/25/18 at 05:30


Ferrous Sulfate (Feosol) 325 mg BIDAFTMEAL PO  Last administered on 5/6/18at 18:

00;  Start 4/25/18 at 09:00


Gabapentin (Neurontin) 300 mg DAILY PO  Last administered on 5/6/18at 07:59;  

Start 4/25/18 at 09:00


Acetaminophen/ Hydrocodone Bitart (Lortab 5/325) 1 tab TID  PRN PO PAIN Last 

administered on 5/5/18at 07:49;  Start 4/25/18 at 05:30


Al Hydroxide/Mg Hydroxide (Mylanta Plus Xs) 30 ml PRN Q4HRS  PRN PO DYSPEPSIA;  

Start 4/25/18 at 05:30


Timolol Maleate (Timoptic 0.5% Ophth) 1 drop DAILY OU  Last administered on 4/26 /18at 10:58;  Start 4/25/18 at 09:00


Vitamin D (Vitamin D3) 50,000 unit WEEKLY PO  Last administered on 5/4/18at 07:

53;  Start 4/27/18 at 09:00


Hydrochlorothiazide (Hydrodiuril) 25 mg DAILY PO  Last administered on 5/6/18at 

07:59;  Start 4/25/18 at 09:00


Loperamide HCl (Imodium) 2 mg PRN Q2HR  PRN PO DIARRHEA;  Start 4/25/18 at 05:30


Magnesium Hydroxide (Milk Of Magnesia) 2,400 mg PRN DAILY  PRN PO CONSTIPATION;

  Start 4/25/18 at 05:30


Metformin HCl (Glucophage) 1,000 mg BIDWMEALS PO  Last administered on 5/6/18at 

17:00;  Start 4/25/18 at 08:00


Potassium Chloride (Klor-Con) 10 meq BIDWMEALS PO  Last administered on 5/5/ 18at 07:49;  Start 4/25/18 at 08:00;  Stop 5/5/18 at 15:21;  Status DC


Linagliptin (Tradjenta) 5 mg DAILY06 PO  Last administered on 5/6/18at 06:09;  

Start 4/25/18 at 06:00


Non-Formulary Medication (Solifenacin Succinate (Vesicare)) 5 mg DAILY PO ;  

Start 4/25/18 at 09:00;  Stop 4/25/18 at 09:00;  Status DC


Oxybutynin Chloride (Ditropan) 5 mg BID PO  Last administered on 5/6/18at 07:59

;  Start 4/25/18 at 09:00


Benztropine Mesylate (Cogentin) 1 mg DAILY PO  Last administered on 5/6/18at 07:

59;  Start 4/25/18 at 11:00


Olanzapine (ZyPREXA ZYDIS) 2.5 mg PRN Q2HR  PRN PO PSYCHOSIS Last administered 

on 4/25/18at 11:04;  Start 4/25/18 at 10:45;  Stop 4/25/18 at 11:26;  Status DC


Olanzapine (ZyPREXA ZYDIS) 5 mg PRN Q2HR  PRN PO PSYCHOSIS Last administered on 

5/6/18at 18:31;  Start 4/25/18 at 11:30


Lorazepam (Ativan) 0.5 mg PRN Q2HR  PRN PO ANXIETY / AGITATION Last 

administered on 5/1/18at 07:54;  Start 4/25/18 at 11:30


Valproic Acid (Depakene) 500 mg QHS PO ;  Start 4/25/18 at 21:00;  Stop 4/26/18 

at 18:46;  Status DC


Haloperidol Lactate (Haldol) 5 mg DAILY IM  Last administered on 4/27/18at 19:42

;  Start 4/26/18 at 03:15;  Stop 4/28/18 at 05:13;  Status DC


Lorazepam (Ativan) 1 mg DAILY IM  Last administered on 4/27/18at 21:12;  Start 4 /26/18 at 03:15;  Stop 4/29/18 at 18:47;  Status DC


Valproic Acid (Depakene) 500 mg QHS PO  Last administered on 4/27/18at 19:15;  

Start 4/26/18 at 21:00;  Stop 4/28/18 at 15:44;  Status DC


Trazodone HCl (Desyrel) 100 mg QHS PO  Last administered on 5/4/18at 19:31;  

Start 4/27/18 at 21:00;  Stop 5/4/18 at 19:37;  Status DC


Trazodone HCl (Desyrel) 100 mg PRN QHS  PRN PO INSOMNIA;  Start 4/27/18 at 20:00


Haloperidol (Haldol) 10 mg DAILY PO  Last administered on 4/30/18at 10:34;  

Start 4/28/18 at 09:00;  Stop 4/30/18 at 19:17;  Status DC


Valproic Acid (Depakene) 500 mg BID PO  Last administered on 5/6/18at 07:59;  

Start 4/28/18 at 16:00


Haloperidol (Haldol) 15 mg DAILY PO  Last administered on 5/6/18at 07:59;  

Start 5/1/18 at 09:00;  Stop 5/7/18 at 10:00


Haloperidol Decanoate (Haldol Decanoate Im Extended Release) 100 mg 1X  ONCE IM

  Last administered on 5/3/18at 15:09;  Start 5/3/18 at 13:30;  Stop 5/3/18 at 

13:34;  Status DC


Haloperidol (Haldol) 10 mg DAILY PO ;  Start 5/8/18 at 09:00


Trazodone HCl (Desyrel) 150 mg QHS PO  Last administered on 5/4/18at 23:29;  

Start 5/4/18 at 21:00;  Stop 5/5/18 at 18:00;  Status DC


Trazodone HCl (Desyrel) 50 mg 1X  ONCE PO  Last administered on 5/4/18at 21:38;

  Start 5/4/18 at 21:30;  Stop 5/4/18 at 21:31;  Status DC


Potassium Chloride (Klor-Con) 20 meq BIDWMEALS PO  Last administered on 5/6/ 18at 17:00;  Start 5/6/18 at 17:00


Trazodone HCl (Desyrel) 100 mg QHS PO ;  Start 5/5/18 at 18:15;  Stop 5/5/18 at 

18:15;  Status DC


Trazodone HCl (Desyrel) 100 mg QHS PO  Last administered on 5/5/18at 19:50;  

Start 5/5/18 at 21:00





Active Scripts


Active


Reported


Benztropine Mesylate 1 Mg Tablet 1 Mg PO DAILY


Atorvastatin Calcium 10 Mg Tablet 10 Mg PO QHS


Amlodipine Besylate 5 Mg Tablet 5 Mg PO DAILY


Januvia (Sitagliptin Phosphate) 100 Mg Tablet 100 Mg PO DAILY06


Potassium Chloride 10 Meq Tablet.er 10 Meq PO BID


Metformin Hcl 1,000 Mg Tablet 1,000 Mg PO BID


Hydrocodone-Apap 5-325  ** (Hydrocodone Bit/Acetaminophen) 1 Each Tablet 1 Tab 

PO TID PRN


Hydrochlorothiazide Tablet (Hydrochlorothiazide) 12.5 Mg Tablet 25 Mg PO DAILY


Gabapentin 300 Mg Capsule 300 Mg PO DAILY


Ferrous Sulfate 325 Mg Tablet 325 Mg PO BID


Vitamin D2 (Ergocalciferol (Vitamin D2)) 50,000 Unit Capsule 50,000 Unit PO QFR


Vesicare (Solifenacin Succinate) 5 Mg Tablet 5 Mg PO DAILY


Timoptic (Timolol Maleate) 10 Ml Drops 1 Ml OU DAILY


Milk Of Magnesia (Magnesium Hydroxide) 2,400 Mg/10 Ml Oral.susp 2,400 Mg PO PRN 

DAILY PRN


Maalox Maximum Strength Susp (Mag Hydrox/Al Hydrox/Simeth) 355 Ml Oral.susp 30 

Ml PO PRN Q4HRS PRN


Loperamide (Loperamide Hcl) 2 Mg Tablet 2 Mg PO PRN Q2HR PRN


Bismatrol (Bismuth Subsalicylate) 262 Mg/15 Ml Oral.susp 262 Mg PO PRN Q4HRS PRN


Tylenol (Acetaminophen) 325 Mg Tablet 650 Mg PO PRN Q4HRS PRN


Zyprexa Zydis (Olanzapine) 5 Mg Tab.rapdis 2.5 Mg PO PRN Q2HR PRN


Haldol Decanoate 100 (Haloperidol Decanoate) 100 Mg/1 Ml Ampul 50 Mg IM [id number]


Invega Sustenna (Paliperidone Palmitate) 234 Mg/1.5 Ml Disp.syrin 234 Mg IM 

QMONTH


I have reviewed the current psychotropics carefully including drug 

interactions.  Risk benefit ratio favors no change other than as noted in my 

dictated progress note.





Diagnosis:


Problems:  


(1) Schizoaffective disorder


(2) Schizoaffective disorder, chronic condition with acute exacerbation


(3) Impulse control disorder


(4) Dementia, vascular, with delusions


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


(7) Bipolar affective, mixed, sev w/ psych


(8) Anxiety disorder











MARYAM HOYT MD May 6, 2018 18:48

## 2018-05-07 VITALS — DIASTOLIC BLOOD PRESSURE: 66 MMHG | SYSTOLIC BLOOD PRESSURE: 99 MMHG

## 2018-05-07 VITALS — SYSTOLIC BLOOD PRESSURE: 111 MMHG | DIASTOLIC BLOOD PRESSURE: 69 MMHG

## 2018-05-07 RX ADMIN — Medication SCH MG: at 16:53

## 2018-05-07 RX ADMIN — Medication SCH MG: at 04:56

## 2018-05-07 RX ADMIN — VALPROIC ACID SCH MG: 250 SOLUTION ORAL at 04:55

## 2018-05-07 RX ADMIN — POTASSIUM CHLORIDE SCH MEQ: 1500 TABLET, EXTENDED RELEASE ORAL at 04:56

## 2018-05-07 RX ADMIN — VALPROIC ACID SCH MG: 250 SOLUTION ORAL at 19:51

## 2018-05-07 RX ADMIN — GABAPENTIN SCH MG: 300 CAPSULE ORAL at 04:55

## 2018-05-07 RX ADMIN — HALOPERIDOL SCH MG: 5 TABLET ORAL at 04:56

## 2018-05-07 RX ADMIN — POTASSIUM CHLORIDE SCH MEQ: 1500 TABLET, EXTENDED RELEASE ORAL at 16:53

## 2018-05-07 RX ADMIN — BENZTROPINE MESYLATE SCH MG: 1 TABLET ORAL at 04:56

## 2018-05-07 RX ADMIN — ATORVASTATIN CALCIUM SCH MG: 10 TABLET, FILM COATED ORAL at 19:51

## 2018-05-07 RX ADMIN — TIMOLOL MALEATE SCH DROP: 5 SOLUTION/ DROPS OPHTHALMIC at 04:58

## 2018-05-07 RX ADMIN — TRAZODONE HYDROCHLORIDE SCH MG: 100 TABLET ORAL at 19:51

## 2018-05-07 RX ADMIN — OXYBUTYNIN CHLORIDE SCH MG: 5 TABLET ORAL at 19:51

## 2018-05-07 RX ADMIN — LINAGLIPTIN SCH MG: 5 TABLET, FILM COATED ORAL at 04:58

## 2018-05-07 RX ADMIN — OXYBUTYNIN CHLORIDE SCH MG: 5 TABLET ORAL at 04:56

## 2018-05-07 NOTE — PDOC
Exam


Note:


Javier Note:


Please also refer to the separate dictated note~for this date of service 

dictated separately.~Patient seen individually. Discussed the patient with 

Nursing staff reviewed the chart.~Reviewed interim history and current 

functioning. Reviewed vital signs,~Labs/ Radiology~and current medications 

noted below. Continue current treatment with the changes noted in the dictated 

addendum note





Assessment:


Vital Signs:





 Vital Signs








  Date Time  Temp Pulse Resp B/P (MAP) Pulse Ox O2 Delivery O2 Flow Rate FiO2


 


5/7/18 16:31 97.0 93 16 99/66 (77) 96   


 


5/6/18 23:36      Room Air  








I&O











Intake and Output 


 


 5/7/18





 07:00


 


Intake Total 480 ml


 


Balance 480 ml


 


 


 


Intake Oral 480 ml








Labs:





 Laboratory Tests








Test


  5/7/18


07:27


 


Glucose (Fingerstick)


  85 mg/dL


(70-99)











Current Medications:


Meds:





Current Medications


Acetaminophen (Tylenol) 650 mg PRN Q6HRS  PRN PO PAIN / TEMP;  Start 4/25/18 at 

05:00;  Stop 4/26/18 at 16:16;  Status DC


Multi-Ingredient Ointment (Analgesic Balm) 1 edin PRN QID  PRN TP MUSCLE PAIN;  

Start 4/25/18 at 05:00


Al Hydroxide/Mg Hydroxide (Mylanta Plus Xs) 15 ml PRN AFTMEALHC  PRN PO 

DYSPEPSIA;  Start 4/25/18 at 05:00;  Stop 4/26/18 at 16:16;  Status DC


Magnesium Hydroxide (Milk Of Magnesia) 2,400 mg PRN QHS  PRN PO CONSTIPATION;  

Start 4/25/18 at 05:00;  Stop 4/26/18 at 16:16;  Status DC


Acetaminophen (Tylenol) 650 mg PRN Q4HRS  PRN PO PAIN / TEMP;  Start 4/25/18 at 

05:30


Amlodipine Besylate (Norvasc) 5 mg DAILY PO  Last administered on 5/7/18at 04:57

;  Start 4/25/18 at 09:00


Atorvastatin Calcium (Lipitor) 10 mg QHS PO  Last administered on 5/7/18at 19:51

;  Start 4/25/18 at 21:00


Bismuth Subsalicylate (Pepto-Bismol) 262 mg PRN Q4HRS  PRN PO DYSPEPSIA;  Start 

4/25/18 at 05:30


Ferrous Sulfate (Feosol) 325 mg BIDAFTMEAL PO  Last administered on 5/7/18at 16:

53;  Start 4/25/18 at 09:00


Gabapentin (Neurontin) 300 mg DAILY PO  Last administered on 5/7/18at 04:55;  

Start 4/25/18 at 09:00


Acetaminophen/ Hydrocodone Bitart (Lortab 5/325) 1 tab TID  PRN PO PAIN Last 

administered on 5/6/18at 20:02;  Start 4/25/18 at 05:30


Al Hydroxide/Mg Hydroxide (Mylanta Plus Xs) 30 ml PRN Q4HRS  PRN PO DYSPEPSIA;  

Start 4/25/18 at 05:30


Timolol Maleate (Timoptic 0.5% Ophth) 1 drop DAILY OU  Last administered on 5/7/ 18at 04:58;  Start 4/25/18 at 09:00


Vitamin D (Vitamin D3) 50,000 unit WEEKLY PO  Last administered on 5/4/18at 07:

53;  Start 4/27/18 at 09:00


Hydrochlorothiazide (Hydrodiuril) 25 mg DAILY PO  Last administered on 5/7/18at 

04:56;  Start 4/25/18 at 09:00


Loperamide HCl (Imodium) 2 mg PRN Q2HR  PRN PO DIARRHEA;  Start 4/25/18 at 05:30


Magnesium Hydroxide (Milk Of Magnesia) 2,400 mg PRN DAILY  PRN PO CONSTIPATION;

  Start 4/25/18 at 05:30


Metformin HCl (Glucophage) 1,000 mg BIDWMEALS PO  Last administered on 5/7/18at 

16:53;  Start 4/25/18 at 08:00


Potassium Chloride (Klor-Con) 10 meq BIDWMEALS PO  Last administered on 5/5/ 18at 07:49;  Start 4/25/18 at 08:00;  Stop 5/5/18 at 15:21;  Status DC


Linagliptin (Tradjenta) 5 mg DAILY06 PO  Last administered on 5/7/18at 04:58;  

Start 4/25/18 at 06:00


Non-Formulary Medication (Solifenacin Succinate (Vesicare)) 5 mg DAILY PO ;  

Start 4/25/18 at 09:00;  Stop 4/25/18 at 09:00;  Status DC


Oxybutynin Chloride (Ditropan) 5 mg BID PO  Last administered on 5/7/18at 19:51

;  Start 4/25/18 at 09:00


Benztropine Mesylate (Cogentin) 1 mg DAILY PO  Last administered on 5/7/18at 04:

56;  Start 4/25/18 at 11:00


Olanzapine (ZyPREXA ZYDIS) 2.5 mg PRN Q2HR  PRN PO PSYCHOSIS Last administered 

on 4/25/18at 11:04;  Start 4/25/18 at 10:45;  Stop 4/25/18 at 11:26;  Status DC


Olanzapine (ZyPREXA ZYDIS) 5 mg PRN Q2HR  PRN PO PSYCHOSIS Last administered on 

5/6/18at 18:31;  Start 4/25/18 at 11:30


Lorazepam (Ativan) 0.5 mg PRN Q2HR  PRN PO ANXIETY / AGITATION Last 

administered on 5/1/18at 07:54;  Start 4/25/18 at 11:30


Valproic Acid (Depakene) 500 mg QHS PO ;  Start 4/25/18 at 21:00;  Stop 4/26/18 

at 18:46;  Status DC


Haloperidol Lactate (Haldol) 5 mg DAILY IM  Last administered on 4/27/18at 19:42

;  Start 4/26/18 at 03:15;  Stop 4/28/18 at 05:13;  Status DC


Lorazepam (Ativan) 1 mg DAILY IM  Last administered on 4/27/18at 21:12;  Start 4 /26/18 at 03:15;  Stop 4/29/18 at 18:47;  Status DC


Valproic Acid (Depakene) 500 mg QHS PO  Last administered on 4/27/18at 19:15;  

Start 4/26/18 at 21:00;  Stop 4/28/18 at 15:44;  Status DC


Trazodone HCl (Desyrel) 100 mg QHS PO  Last administered on 5/4/18at 19:31;  

Start 4/27/18 at 21:00;  Stop 5/4/18 at 19:37;  Status DC


Trazodone HCl (Desyrel) 100 mg PRN QHS  PRN PO INSOMNIA;  Start 4/27/18 at 20:00


Haloperidol (Haldol) 10 mg DAILY PO  Last administered on 4/30/18at 10:34;  

Start 4/28/18 at 09:00;  Stop 4/30/18 at 19:17;  Status DC


Valproic Acid (Depakene) 500 mg BID PO  Last administered on 5/7/18at 19:51;  

Start 4/28/18 at 16:00


Haloperidol (Haldol) 15 mg DAILY PO  Last administered on 5/7/18at 04:56;  

Start 5/1/18 at 09:00;  Stop 5/7/18 at 10:00;  Status DC


Haloperidol Decanoate (Haldol Decanoate Im Extended Release) 100 mg 1X  ONCE IM

  Last administered on 5/3/18at 15:09;  Start 5/3/18 at 13:30;  Stop 5/3/18 at 

13:34;  Status DC


Haloperidol (Haldol) 10 mg DAILY PO ;  Start 5/8/18 at 09:00


Trazodone HCl (Desyrel) 150 mg QHS PO  Last administered on 5/4/18at 23:29;  

Start 5/4/18 at 21:00;  Stop 5/5/18 at 18:00;  Status DC


Trazodone HCl (Desyrel) 50 mg 1X  ONCE PO  Last administered on 5/4/18at 21:38;

  Start 5/4/18 at 21:30;  Stop 5/4/18 at 21:31;  Status DC


Potassium Chloride (Klor-Con) 20 meq BIDWMEALS PO  Last administered on 5/7/ 18at 16:53;  Start 5/6/18 at 17:00


Trazodone HCl (Desyrel) 100 mg QHS PO ;  Start 5/5/18 at 18:15;  Stop 5/5/18 at 

18:15;  Status DC


Trazodone HCl (Desyrel) 100 mg QHS PO  Last administered on 5/7/18at 19:51;  

Start 5/5/18 at 21:00





Active Scripts


Active


Reported


Benztropine Mesylate 1 Mg Tablet 1 Mg PO DAILY


Atorvastatin Calcium 10 Mg Tablet 10 Mg PO QHS


Amlodipine Besylate 5 Mg Tablet 5 Mg PO DAILY


Januvia (Sitagliptin Phosphate) 100 Mg Tablet 100 Mg PO DAILY06


Potassium Chloride 10 Meq Tablet.er 10 Meq PO BID


Metformin Hcl 1,000 Mg Tablet 1,000 Mg PO BID


Hydrocodone-Apap 5-325  ** (Hydrocodone Bit/Acetaminophen) 1 Each Tablet 1 Tab 

PO TID PRN


Hydrochlorothiazide Tablet (Hydrochlorothiazide) 12.5 Mg Tablet 25 Mg PO DAILY


Gabapentin 300 Mg Capsule 300 Mg PO DAILY


Ferrous Sulfate 325 Mg Tablet 325 Mg PO BID


Vitamin D2 (Ergocalciferol (Vitamin D2)) 50,000 Unit Capsule 50,000 Unit PO QFR


Vesicare (Solifenacin Succinate) 5 Mg Tablet 5 Mg PO DAILY


Timoptic (Timolol Maleate) 10 Ml Drops 1 Ml OU DAILY


Milk Of Magnesia (Magnesium Hydroxide) 2,400 Mg/10 Ml Oral.susp 2,400 Mg PO PRN 

DAILY PRN


Maalox Maximum Strength Susp (Mag Hydrox/Al Hydrox/Simeth) 355 Ml Oral.susp 30 

Ml PO PRN Q4HRS PRN


Loperamide (Loperamide Hcl) 2 Mg Tablet 2 Mg PO PRN Q2HR PRN


Bismatrol (Bismuth Subsalicylate) 262 Mg/15 Ml Oral.susp 262 Mg PO PRN Q4HRS PRN


Tylenol (Acetaminophen) 325 Mg Tablet 650 Mg PO PRN Q4HRS PRN


Zyprexa Zydis (Olanzapine) 5 Mg Tab.rapdis 2.5 Mg PO PRN Q2HR PRN


Haldol Decanoate 100 (Haloperidol Decanoate) 100 Mg/1 Ml Ampul 50 Mg IM [id number]


Invega Sustenna (Paliperidone Palmitate) 234 Mg/1.5 Ml Disp.syrin 234 Mg IM 

QMONTH


I have reviewed the current psychotropics carefully including drug 

interactions.  Risk benefit ratio favors no change other than as noted in my 

dictated progress note.





Diagnosis:


Problems:  


(1) Anxiety disorder


(2) Bipolar affective, mixed, sev w/ psych


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


(5) Dementia, vascular, with delusions


(6) Impulse control disorder


(7) Schizoaffective disorder, chronic condition with acute exacerbation


(8) Schizoaffective disorder











MARYAM HOYT MD May 7, 2018 20:59

## 2018-05-08 VITALS — DIASTOLIC BLOOD PRESSURE: 64 MMHG | SYSTOLIC BLOOD PRESSURE: 117 MMHG

## 2018-05-08 VITALS — DIASTOLIC BLOOD PRESSURE: 75 MMHG | SYSTOLIC BLOOD PRESSURE: 135 MMHG

## 2018-05-08 RX ADMIN — BENZTROPINE MESYLATE SCH MG: 1 TABLET ORAL at 09:11

## 2018-05-08 RX ADMIN — Medication SCH MG: at 16:54

## 2018-05-08 RX ADMIN — POTASSIUM CHLORIDE SCH MEQ: 1500 TABLET, EXTENDED RELEASE ORAL at 09:12

## 2018-05-08 RX ADMIN — LINAGLIPTIN SCH MG: 5 TABLET, FILM COATED ORAL at 05:35

## 2018-05-08 RX ADMIN — TIMOLOL MALEATE SCH DROP: 5 SOLUTION/ DROPS OPHTHALMIC at 09:00

## 2018-05-08 RX ADMIN — HALOPERIDOL SCH MG: 5 TABLET ORAL at 09:14

## 2018-05-08 RX ADMIN — ATORVASTATIN CALCIUM SCH MG: 10 TABLET, FILM COATED ORAL at 19:58

## 2018-05-08 RX ADMIN — Medication SCH MG: at 09:12

## 2018-05-08 RX ADMIN — VALPROIC ACID SCH MG: 250 SOLUTION ORAL at 09:12

## 2018-05-08 RX ADMIN — POTASSIUM CHLORIDE SCH MEQ: 1500 TABLET, EXTENDED RELEASE ORAL at 16:54

## 2018-05-08 RX ADMIN — GABAPENTIN SCH MG: 300 CAPSULE ORAL at 09:12

## 2018-05-08 RX ADMIN — TRAZODONE HYDROCHLORIDE SCH MG: 100 TABLET ORAL at 19:58

## 2018-05-08 RX ADMIN — OXYBUTYNIN CHLORIDE SCH MG: 5 TABLET ORAL at 09:11

## 2018-05-08 RX ADMIN — VALPROIC ACID SCH MG: 250 SOLUTION ORAL at 19:57

## 2018-05-08 RX ADMIN — OXYBUTYNIN CHLORIDE SCH MG: 5 TABLET ORAL at 19:58

## 2018-05-08 NOTE — PN
DATE:  05/05/2018



This is a late entry for 05/05, covers elements not covered in my initial note

of 05/05.



SUBJECTIVE:  I met with the patient in the evening.  The patient remained

somewhat restless, wandering, difficult to understand, pressure of speech is

evident.  Takes her meds in ice cream, receives Zyprexa at supper time due to

agitation and psychotic symptoms.



REVIEW OF SYSTEMS:  No CV, , pulmonary, eye, ENT system symptoms on review. 

Reliability poor.



MENTAL STATUS EXAM:  Oriented to herself.  Insight, judgment, recent and remote

memory impaired.  No active suicidal or homicidal ideation.  Remains quite

psychotic.



LABORATORY DATA:  Reviewed.



IMPRESSION:  Unchanged from initial note.



PLAN:  Continue current psychotropics.  Encourage oral compliance.





______________________________

MAN CURTIS HOYT MD



DR:  ARIE/kain  JOB#:  [id number] / [id number]

DD:  05/07/2018 16:33  DT:  05/08/2018 04:50

## 2018-05-08 NOTE — PN
DATE:  05/07/2018



PSYCHIATRIC PROGRESS NOTE





This late entry 05/07/2018 covers elements not covered in my initial note of

05/07/2018.





SUBJECTIVE:  Met with the patient in the evening of 05/07/2018.  The patient

slept 7-1/2 hours previous evening.  Per nursing report, she is little easier to

understand, pressure of speech is slightly better, takes her medications in

chocolate ice cream.  Valproic acid level is therapeutic at 77.





REVIEW OF SYSTEMS:  No CV, , pulmonary, eye, ENT system symptoms on review. 

Reliability is poor.





MENTAL STATUS EXAM:  Oriented to herself, situation.  Speech can be rapid at

times.  Abstraction fair, computation is impaired, language function intact,

attention span short.  Mood and affect remain somewhat labile.





LABORATORY DATA:  Reviewed.





IMPRESSION:  Schizoaffective disorder, bipolar type, mixed with psychotic

features; major neurocognitive disorder, Alzheimer, vascular with depression,

delusion.  Rest unchanged.





PLAN:  Continue current psychotropics.  We will give the second dosage of Haldol

Decanoate 50 mg on 05/09/2018.  Maintain rest unchanged.





______________________________

MAN CURTIS HOYT MD



DR:  ARIE/kain  JOB#:  [id number] / [id number]

DD:  05/08/2018 12:56  DT:  05/08/2018 23:07

## 2018-05-08 NOTE — PDOC
Exam


Note:


Javier Note:


Please also refer to the separate dictated note~for this date of service 

dictated separately.~Patient seen individually. Discussed the patient with 

Nursing staff reviewed the chart.~Reviewed interim history and current 

functioning. Reviewed vital signs,~Labs/ Radiology~and current medications 

noted below. Continue current treatment with the changes noted in the dictated 

addendum note





Assessment:


Vital Signs:





 Vital Signs








  Date Time  Temp Pulse Resp B/P (MAP) Pulse Ox O2 Delivery O2 Flow Rate FiO2


 


5/8/18 16:15 97.5 104 24 135/75 (95) 96   


 


5/8/18 06:17      Room Air  








I&O











Intake and Output 


 


 5/8/18





 07:00


 


Intake Total 900 ml


 


Balance 900 ml


 


 


 


Intake Oral 900 ml








Labs:





 Laboratory Tests








Test


  5/8/18


07:31


 


Glucose (Fingerstick)


  102 mg/dL


(70-99)  H











Current Medications:


Meds:





Current Medications


Acetaminophen (Tylenol) 650 mg PRN Q6HRS  PRN PO PAIN / TEMP;  Start 4/25/18 at 

05:00;  Stop 4/26/18 at 16:16;  Status DC


Multi-Ingredient Ointment (Analgesic Balm) 1 edin PRN QID  PRN TP MUSCLE PAIN;  

Start 4/25/18 at 05:00


Al Hydroxide/Mg Hydroxide (Mylanta Plus Xs) 15 ml PRN AFTMEALHC  PRN PO 

DYSPEPSIA;  Start 4/25/18 at 05:00;  Stop 4/26/18 at 16:16;  Status DC


Magnesium Hydroxide (Milk Of Magnesia) 2,400 mg PRN QHS  PRN PO CONSTIPATION;  

Start 4/25/18 at 05:00;  Stop 4/26/18 at 16:16;  Status DC


Acetaminophen (Tylenol) 650 mg PRN Q4HRS  PRN PO PAIN / TEMP;  Start 4/25/18 at 

05:30


Amlodipine Besylate (Norvasc) 5 mg DAILY PO  Last administered on 5/8/18at 09:11

;  Start 4/25/18 at 09:00


Atorvastatin Calcium (Lipitor) 10 mg QHS PO  Last administered on 5/8/18at 19:58

;  Start 4/25/18 at 21:00


Bismuth Subsalicylate (Pepto-Bismol) 262 mg PRN Q4HRS  PRN PO DYSPEPSIA;  Start 

4/25/18 at 05:30


Ferrous Sulfate (Feosol) 325 mg BIDAFTMEAL PO  Last administered on 5/8/18at 16:

54;  Start 4/25/18 at 09:00


Gabapentin (Neurontin) 300 mg DAILY PO  Last administered on 5/8/18at 09:12;  

Start 4/25/18 at 09:00


Acetaminophen/ Hydrocodone Bitart (Lortab 5/325) 1 tab TID  PRN PO PAIN Last 

administered on 5/6/18at 20:02;  Start 4/25/18 at 05:30


Al Hydroxide/Mg Hydroxide (Mylanta Plus Xs) 30 ml PRN Q4HRS  PRN PO DYSPEPSIA;  

Start 4/25/18 at 05:30


Timolol Maleate (Timoptic 0.5% Ophth) 1 drop DAILY OU  Last administered on 5/7/ 18at 04:58;  Start 4/25/18 at 09:00


Vitamin D (Vitamin D3) 50,000 unit WEEKLY PO  Last administered on 5/4/18at 07:

53;  Start 4/27/18 at 09:00


Hydrochlorothiazide (Hydrodiuril) 25 mg DAILY PO  Last administered on 5/8/18at 

09:11;  Start 4/25/18 at 09:00


Loperamide HCl (Imodium) 2 mg PRN Q2HR  PRN PO DIARRHEA;  Start 4/25/18 at 05:30


Magnesium Hydroxide (Milk Of Magnesia) 2,400 mg PRN DAILY  PRN PO CONSTIPATION;

  Start 4/25/18 at 05:30


Metformin HCl (Glucophage) 1,000 mg BIDWMEALS PO  Last administered on 5/8/18at 

16:54;  Start 4/25/18 at 08:00


Potassium Chloride (Klor-Con) 10 meq BIDWMEALS PO  Last administered on 5/5/ 18at 07:49;  Start 4/25/18 at 08:00;  Stop 5/5/18 at 15:21;  Status DC


Linagliptin (Tradjenta) 5 mg DAILY06 PO  Last administered on 5/8/18at 05:35;  

Start 4/25/18 at 06:00


Non-Formulary Medication (Solifenacin Succinate (Vesicare)) 5 mg DAILY PO ;  

Start 4/25/18 at 09:00;  Stop 4/25/18 at 09:00;  Status DC


Oxybutynin Chloride (Ditropan) 5 mg BID PO  Last administered on 5/8/18at 19:58

;  Start 4/25/18 at 09:00


Benztropine Mesylate (Cogentin) 1 mg DAILY PO  Last administered on 5/8/18at 09:

11;  Start 4/25/18 at 11:00


Olanzapine (ZyPREXA ZYDIS) 2.5 mg PRN Q2HR  PRN PO PSYCHOSIS Last administered 

on 4/25/18at 11:04;  Start 4/25/18 at 10:45;  Stop 4/25/18 at 11:26;  Status DC


Olanzapine (ZyPREXA ZYDIS) 5 mg PRN Q2HR  PRN PO PSYCHOSIS Last administered on 

5/6/18at 18:31;  Start 4/25/18 at 11:30


Lorazepam (Ativan) 0.5 mg PRN Q2HR  PRN PO ANXIETY / AGITATION Last 

administered on 5/1/18at 07:54;  Start 4/25/18 at 11:30


Valproic Acid (Depakene) 500 mg QHS PO ;  Start 4/25/18 at 21:00;  Stop 4/26/18 

at 18:46;  Status DC


Haloperidol Lactate (Haldol) 5 mg DAILY IM  Last administered on 4/27/18at 19:42

;  Start 4/26/18 at 03:15;  Stop 4/28/18 at 05:13;  Status DC


Lorazepam (Ativan) 1 mg DAILY IM  Last administered on 4/27/18at 21:12;  Start 4 /26/18 at 03:15;  Stop 4/29/18 at 18:47;  Status DC


Valproic Acid (Depakene) 500 mg QHS PO  Last administered on 4/27/18at 19:15;  

Start 4/26/18 at 21:00;  Stop 4/28/18 at 15:44;  Status DC


Trazodone HCl (Desyrel) 100 mg QHS PO  Last administered on 5/4/18at 19:31;  

Start 4/27/18 at 21:00;  Stop 5/4/18 at 19:37;  Status DC


Trazodone HCl (Desyrel) 100 mg PRN QHS  PRN PO INSOMNIA;  Start 4/27/18 at 20:00


Haloperidol (Haldol) 10 mg DAILY PO  Last administered on 4/30/18at 10:34;  

Start 4/28/18 at 09:00;  Stop 4/30/18 at 19:17;  Status DC


Valproic Acid (Depakene) 500 mg BID PO  Last administered on 5/8/18at 19:57;  

Start 4/28/18 at 16:00


Haloperidol (Haldol) 15 mg DAILY PO  Last administered on 5/7/18at 04:56;  

Start 5/1/18 at 09:00;  Stop 5/7/18 at 10:00;  Status DC


Haloperidol Decanoate (Haldol Decanoate Im Extended Release) 100 mg 1X  ONCE IM

  Last administered on 5/3/18at 15:09;  Start 5/3/18 at 13:30;  Stop 5/3/18 at 

13:34;  Status DC


Haloperidol (Haldol) 10 mg DAILY PO  Last administered on 5/8/18at 09:14;  

Start 5/8/18 at 09:00


Trazodone HCl (Desyrel) 150 mg QHS PO  Last administered on 5/4/18at 23:29;  

Start 5/4/18 at 21:00;  Stop 5/5/18 at 18:00;  Status DC


Trazodone HCl (Desyrel) 50 mg 1X  ONCE PO  Last administered on 5/4/18at 21:38;

  Start 5/4/18 at 21:30;  Stop 5/4/18 at 21:31;  Status DC


Potassium Chloride (Klor-Con) 20 meq BIDWMEALS PO  Last administered on 5/8/ 18at 16:54;  Start 5/6/18 at 17:00


Trazodone HCl (Desyrel) 100 mg QHS PO ;  Start 5/5/18 at 18:15;  Stop 5/5/18 at 

18:15;  Status DC


Trazodone HCl (Desyrel) 100 mg QHS PO  Last administered on 5/8/18at 19:58;  

Start 5/5/18 at 21:00


Haloperidol Decanoate (Haldol Decanoate Im Extended Release) 50 mg 1X  ONCE IM 

;  Start 5/9/18 at 09:00;  Stop 5/9/18 at 09:01


Haloperidol Decanoate (Haldol Decanoate Im Extended Release) 150 mg QMONTH IM ;

  Start 6/8/18 at 09:00





Active Scripts


Active


Reported


Benztropine Mesylate 1 Mg Tablet 1 Mg PO DAILY


Atorvastatin Calcium 10 Mg Tablet 10 Mg PO QHS


Amlodipine Besylate 5 Mg Tablet 5 Mg PO DAILY


Januvia (Sitagliptin Phosphate) 100 Mg Tablet 100 Mg PO DAILY06


Potassium Chloride 10 Meq Tablet.er 10 Meq PO BID


Metformin Hcl 1,000 Mg Tablet 1,000 Mg PO BID


Hydrocodone-Apap 5-325  ** (Hydrocodone Bit/Acetaminophen) 1 Each Tablet 1 Tab 

PO TID PRN


Hydrochlorothiazide Tablet (Hydrochlorothiazide) 12.5 Mg Tablet 25 Mg PO DAILY


Gabapentin 300 Mg Capsule 300 Mg PO DAILY


Ferrous Sulfate 325 Mg Tablet 325 Mg PO BID


Vitamin D2 (Ergocalciferol (Vitamin D2)) 50,000 Unit Capsule 50,000 Unit PO QFR


Vesicare (Solifenacin Succinate) 5 Mg Tablet 5 Mg PO DAILY


Timoptic (Timolol Maleate) 10 Ml Drops 1 Ml OU DAILY


Milk Of Magnesia (Magnesium Hydroxide) 2,400 Mg/10 Ml Oral.susp 2,400 Mg PO PRN 

DAILY PRN


Maalox Maximum Strength Susp (Mag Hydrox/Al Hydrox/Simeth) 355 Ml Oral.susp 30 

Ml PO PRN Q4HRS PRN


Loperamide (Loperamide Hcl) 2 Mg Tablet 2 Mg PO PRN Q2HR PRN


Bismatrol (Bismuth Subsalicylate) 262 Mg/15 Ml Oral.susp 262 Mg PO PRN Q4HRS PRN


Tylenol (Acetaminophen) 325 Mg Tablet 650 Mg PO PRN Q4HRS PRN


Zyprexa Zydis (Olanzapine) 5 Mg Tab.rapdis 2.5 Mg PO PRN Q2HR PRN


Haldol Decanoate 100 (Haloperidol Decanoate) 100 Mg/1 Ml Ampul 50 Mg IM [id number]


Invega Sustenna (Paliperidone Palmitate) 234 Mg/1.5 Ml Disp.syrin 234 Mg IM 

QMONTH


I have reviewed the current psychotropics carefully including drug 

interactions.  Risk benefit ratio favors no change other than as noted in my 

dictated progress note.





Diagnosis:


Problems:  


(1) Anxiety disorder


(2) Bipolar affective, mixed, sev w/ psych


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


(5) Dementia, vascular, with delusions


(6) Impulse control disorder


(7) Schizoaffective disorder, chronic condition with acute exacerbation


(8) Schizoaffective disorder











MARYAM HOYT MD May 8, 2018 20:53

## 2018-05-08 NOTE — PN
DATE:  05/06/2018



PSYCHIATRIC PROGRESS NOTE



This late entry 05/06/2018 covers elements not covered in my initial note of

05/06/2018.



SUBJECTIVE:  I met with the patient in her room.  She continues to have racing

thoughts, is paranoid with marked mood lability, but showing improvement as

compared to a few days back.  Still relatively noncompliant with oral

medications.



REVIEW OF SYSTEMS:  No CV, , pulmonary, eye, ENT system symptoms on review. 

Reliability poor.



MENTAL STATUS EXAM:  Oriented to herself and situation.  Speech coherent, rapid

at times.  Abstraction fair, computation impaired, language function intact. 

Mood and affect remains labile, less so than before.



LABORATORIES:  Reviewed.



IMPRESSION:  Unchanged from initial note.



PLAN:  Continue current psychotropics.





______________________________

MAN CURTIS HOYT MD



DR:  ARIE/kain  JOB#:  [id number] / [id number]

DD:  05/07/2018 16:35  DT:  05/08/2018 04:51

## 2018-05-08 NOTE — PN
DATE:  05/04/2018



PSYCHIATRIC PROGRESS NOTE



This late entry 05/04/2018 covers elements not covered in my initial note

05/04/2018.



SUBJECTIVE:  I met with the patient in the evening.  The patient slept 3-3/4

hours last previous evening and we will increase the trazodone from 100 mg at

bedtime, may repeat x 1 to _____ 150 mg at bedtime, may repeat x 1.  She takes

her meds orally in ice cream, will be checking labs morning of 05/05/2018.



REVIEW OF SYSTEMS:  No CV, , pulmonary, eye, ENT system symptoms on review. 

Reliability poor, still psychotic, paranoid, dismissive as I attempted to meet

with her.



MENTAL STATUS EXAM:  Insight, judgment, recent and remote memory, attention,

concentration, fund of knowledge poor, consistent with her diagnosis mentioned

in my initial note.



PLAN:  Increase the trazodone.  Rest unchanged from initial note.





______________________________

MARYAM HOYT MD



DR:  ARIE/kain  JOB#:  [id number] / [id number]

DD:  05/07/2018 16:32  DT:  05/08/2018 04:48

## 2018-05-09 VITALS — DIASTOLIC BLOOD PRESSURE: 65 MMHG | SYSTOLIC BLOOD PRESSURE: 106 MMHG

## 2018-05-09 RX ADMIN — TIMOLOL MALEATE SCH DROP: 5 SOLUTION/ DROPS OPHTHALMIC at 08:32

## 2018-05-09 RX ADMIN — TRAZODONE HYDROCHLORIDE SCH MG: 100 TABLET ORAL at 19:46

## 2018-05-09 RX ADMIN — POTASSIUM CHLORIDE SCH MEQ: 1500 TABLET, EXTENDED RELEASE ORAL at 08:32

## 2018-05-09 RX ADMIN — OXYBUTYNIN CHLORIDE SCH MG: 5 TABLET ORAL at 08:32

## 2018-05-09 RX ADMIN — POTASSIUM CHLORIDE SCH MEQ: 1500 TABLET, EXTENDED RELEASE ORAL at 17:18

## 2018-05-09 RX ADMIN — Medication SCH MG: at 17:18

## 2018-05-09 RX ADMIN — VALPROIC ACID SCH MG: 250 SOLUTION ORAL at 19:46

## 2018-05-09 RX ADMIN — VALPROIC ACID SCH MG: 250 SOLUTION ORAL at 08:31

## 2018-05-09 RX ADMIN — HALOPERIDOL SCH MG: 5 TABLET ORAL at 08:31

## 2018-05-09 RX ADMIN — ATORVASTATIN CALCIUM SCH MG: 10 TABLET, FILM COATED ORAL at 19:46

## 2018-05-09 RX ADMIN — GABAPENTIN SCH MG: 300 CAPSULE ORAL at 08:31

## 2018-05-09 RX ADMIN — Medication SCH MG: at 08:31

## 2018-05-09 RX ADMIN — LINAGLIPTIN SCH MG: 5 TABLET, FILM COATED ORAL at 06:05

## 2018-05-09 RX ADMIN — BENZTROPINE MESYLATE SCH MG: 1 TABLET ORAL at 08:32

## 2018-05-09 RX ADMIN — OXYBUTYNIN CHLORIDE SCH MG: 5 TABLET ORAL at 19:46

## 2018-05-09 NOTE — PDOC
Exam


Note:


Javier Note:


Please also refer to the separate dictated note~for this date of service 

dictated separately.~Patient seen individually. Discussed the patient with 

Nursing staff reviewed the chart.~Reviewed interim history and current 

functioning. Reviewed vital signs,~Labs/ Radiology~and current medications 

noted below. Continue current treatment with the changes noted in the dictated 

addendum note





Assessment:


Vital Signs:





 Vital Signs








  Date Time  Temp Pulse Resp B/P (MAP) Pulse Ox O2 Delivery O2 Flow Rate FiO2


 


5/9/18 08:32  93  106/65    


 


5/9/18 06:00 97.9  18  96   


 


5/8/18 06:17      Room Air  








I&O











Intake and Output 


 


 5/9/18





 07:00


 


Intake Total 720 ml


 


Balance 720 ml


 


 


 


Intake Oral 720 ml











Current Medications:


Meds:





Current Medications


Acetaminophen (Tylenol) 650 mg PRN Q6HRS  PRN PO PAIN / TEMP;  Start 4/25/18 at 

05:00;  Stop 4/26/18 at 16:16;  Status DC


Multi-Ingredient Ointment (Analgesic Balm) 1 edin PRN QID  PRN TP MUSCLE PAIN;  

Start 4/25/18 at 05:00


Al Hydroxide/Mg Hydroxide (Mylanta Plus Xs) 15 ml PRN AFTMEALHC  PRN PO 

DYSPEPSIA;  Start 4/25/18 at 05:00;  Stop 4/26/18 at 16:16;  Status DC


Magnesium Hydroxide (Milk Of Magnesia) 2,400 mg PRN QHS  PRN PO CONSTIPATION;  

Start 4/25/18 at 05:00;  Stop 4/26/18 at 16:16;  Status DC


Acetaminophen (Tylenol) 650 mg PRN Q4HRS  PRN PO PAIN / TEMP;  Start 4/25/18 at 

05:30


Amlodipine Besylate (Norvasc) 5 mg DAILY PO  Last administered on 5/9/18at 08:32

;  Start 4/25/18 at 09:00


Atorvastatin Calcium (Lipitor) 10 mg QHS PO  Last administered on 5/9/18at 19:46

;  Start 4/25/18 at 21:00


Bismuth Subsalicylate (Pepto-Bismol) 262 mg PRN Q4HRS  PRN PO DYSPEPSIA;  Start 

4/25/18 at 05:30


Ferrous Sulfate (Feosol) 325 mg BIDAFTMEAL PO  Last administered on 5/9/18at 17:

18;  Start 4/25/18 at 09:00


Gabapentin (Neurontin) 300 mg DAILY PO  Last administered on 5/9/18at 08:31;  

Start 4/25/18 at 09:00


Acetaminophen/ Hydrocodone Bitart (Lortab 5/325) 1 tab TID  PRN PO PAIN Last 

administered on 5/6/18at 20:02;  Start 4/25/18 at 05:30


Al Hydroxide/Mg Hydroxide (Mylanta Plus Xs) 30 ml PRN Q4HRS  PRN PO DYSPEPSIA;  

Start 4/25/18 at 05:30


Timolol Maleate (Timoptic 0.5% Ophth) 1 drop DAILY OU  Last administered on 5/7/ 18at 04:58;  Start 4/25/18 at 09:00


Vitamin D (Vitamin D3) 50,000 unit WEEKLY PO  Last administered on 5/4/18at 07:

53;  Start 4/27/18 at 09:00


Hydrochlorothiazide (Hydrodiuril) 25 mg DAILY PO  Last administered on 5/9/18at 

08:31;  Start 4/25/18 at 09:00


Loperamide HCl (Imodium) 2 mg PRN Q2HR  PRN PO DIARRHEA;  Start 4/25/18 at 05:30


Magnesium Hydroxide (Milk Of Magnesia) 2,400 mg PRN DAILY  PRN PO CONSTIPATION;

  Start 4/25/18 at 05:30


Metformin HCl (Glucophage) 1,000 mg BIDWMEALS PO  Last administered on 5/9/18at 

17:18;  Start 4/25/18 at 08:00


Potassium Chloride (Klor-Con) 10 meq BIDWMEALS PO  Last administered on 5/5/ 18at 07:49;  Start 4/25/18 at 08:00;  Stop 5/5/18 at 15:21;  Status DC


Linagliptin (Tradjenta) 5 mg DAILY06 PO  Last administered on 5/9/18at 06:05;  

Start 4/25/18 at 06:00


Non-Formulary Medication (Solifenacin Succinate (Vesicare)) 5 mg DAILY PO ;  

Start 4/25/18 at 09:00;  Stop 4/25/18 at 09:00;  Status DC


Oxybutynin Chloride (Ditropan) 5 mg BID PO  Last administered on 5/9/18at 19:46

;  Start 4/25/18 at 09:00


Benztropine Mesylate (Cogentin) 1 mg DAILY PO  Last administered on 5/9/18at 08:

32;  Start 4/25/18 at 11:00


Olanzapine (ZyPREXA ZYDIS) 2.5 mg PRN Q2HR  PRN PO PSYCHOSIS Last administered 

on 4/25/18at 11:04;  Start 4/25/18 at 10:45;  Stop 4/25/18 at 11:26;  Status DC


Olanzapine (ZyPREXA ZYDIS) 5 mg PRN Q2HR  PRN PO PSYCHOSIS Last administered on 

5/6/18at 18:31;  Start 4/25/18 at 11:30


Lorazepam (Ativan) 0.5 mg PRN Q2HR  PRN PO ANXIETY / AGITATION Last 

administered on 5/1/18at 07:54;  Start 4/25/18 at 11:30


Valproic Acid (Depakene) 500 mg QHS PO ;  Start 4/25/18 at 21:00;  Stop 4/26/18 

at 18:46;  Status DC


Haloperidol Lactate (Haldol) 5 mg DAILY IM  Last administered on 4/27/18at 19:42

;  Start 4/26/18 at 03:15;  Stop 4/28/18 at 05:13;  Status DC


Lorazepam (Ativan) 1 mg DAILY IM  Last administered on 4/27/18at 21:12;  Start 4 /26/18 at 03:15;  Stop 4/29/18 at 18:47;  Status DC


Valproic Acid (Depakene) 500 mg QHS PO  Last administered on 4/27/18at 19:15;  

Start 4/26/18 at 21:00;  Stop 4/28/18 at 15:44;  Status DC


Trazodone HCl (Desyrel) 100 mg QHS PO  Last administered on 5/4/18at 19:31;  

Start 4/27/18 at 21:00;  Stop 5/4/18 at 19:37;  Status DC


Trazodone HCl (Desyrel) 100 mg PRN QHS  PRN PO INSOMNIA;  Start 4/27/18 at 20:00


Haloperidol (Haldol) 10 mg DAILY PO  Last administered on 4/30/18at 10:34;  

Start 4/28/18 at 09:00;  Stop 4/30/18 at 19:17;  Status DC


Valproic Acid (Depakene) 500 mg BID PO  Last administered on 5/9/18at 19:46;  

Start 4/28/18 at 16:00


Haloperidol (Haldol) 15 mg DAILY PO  Last administered on 5/7/18at 04:56;  

Start 5/1/18 at 09:00;  Stop 5/7/18 at 10:00;  Status DC


Haloperidol Decanoate (Haldol Decanoate Im Extended Release) 100 mg 1X  ONCE IM

  Last administered on 5/3/18at 15:09;  Start 5/3/18 at 13:30;  Stop 5/3/18 at 

13:34;  Status DC


Haloperidol (Haldol) 10 mg DAILY PO  Last administered on 5/9/18at 08:31;  

Start 5/8/18 at 09:00


Trazodone HCl (Desyrel) 150 mg QHS PO  Last administered on 5/4/18at 23:29;  

Start 5/4/18 at 21:00;  Stop 5/5/18 at 18:00;  Status DC


Trazodone HCl (Desyrel) 50 mg 1X  ONCE PO  Last administered on 5/4/18at 21:38;

  Start 5/4/18 at 21:30;  Stop 5/4/18 at 21:31;  Status DC


Potassium Chloride (Klor-Con) 20 meq BIDWMEALS PO  Last administered on 5/9/ 18at 17:18;  Start 5/6/18 at 17:00


Trazodone HCl (Desyrel) 100 mg QHS PO ;  Start 5/5/18 at 18:15;  Stop 5/5/18 at 

18:15;  Status DC


Trazodone HCl (Desyrel) 100 mg QHS PO  Last administered on 5/9/18at 19:46;  

Start 5/5/18 at 21:00


Haloperidol Decanoate (Haldol Decanoate Im Extended Release) 50 mg 1X  ONCE IM  

Last administered on 5/9/18at 12:11;  Start 5/9/18 at 09:00;  Stop 5/9/18 at 09:

01;  Status DC


Haloperidol Decanoate (Haldol Decanoate Im Extended Release) 150 mg QMONTH IM ;

  Start 6/8/18 at 09:00





Active Scripts


Active


Reported


Benztropine Mesylate 1 Mg Tablet 1 Mg PO DAILY


Atorvastatin Calcium 10 Mg Tablet 10 Mg PO QHS


Amlodipine Besylate 5 Mg Tablet 5 Mg PO DAILY


Januvia (Sitagliptin Phosphate) 100 Mg Tablet 100 Mg PO DAILY06


Potassium Chloride 10 Meq Tablet.er 10 Meq PO BID


Metformin Hcl 1,000 Mg Tablet 1,000 Mg PO BID


Hydrocodone-Apap 5-325  ** (Hydrocodone Bit/Acetaminophen) 1 Each Tablet 1 Tab 

PO TID PRN


Hydrochlorothiazide Tablet (Hydrochlorothiazide) 12.5 Mg Tablet 25 Mg PO DAILY


Gabapentin 300 Mg Capsule 300 Mg PO DAILY


Ferrous Sulfate 325 Mg Tablet 325 Mg PO BID


Vitamin D2 (Ergocalciferol (Vitamin D2)) 50,000 Unit Capsule 50,000 Unit PO QFR


Vesicare (Solifenacin Succinate) 5 Mg Tablet 5 Mg PO DAILY


Timoptic (Timolol Maleate) 10 Ml Drops 1 Ml OU DAILY


Milk Of Magnesia (Magnesium Hydroxide) 2,400 Mg/10 Ml Oral.susp 2,400 Mg PO PRN 

DAILY PRN


Maalox Maximum Strength Susp (Mag Hydrox/Al Hydrox/Simeth) 355 Ml Oral.susp 30 

Ml PO PRN Q4HRS PRN


Loperamide (Loperamide Hcl) 2 Mg Tablet 2 Mg PO PRN Q2HR PRN


Bismatrol (Bismuth Subsalicylate) 262 Mg/15 Ml Oral.susp 262 Mg PO PRN Q4HRS PRN


Tylenol (Acetaminophen) 325 Mg Tablet 650 Mg PO PRN Q4HRS PRN


Zyprexa Zydis (Olanzapine) 5 Mg Tab.rapdis 2.5 Mg PO PRN Q2HR PRN


Haldol Decanoate 100 (Haloperidol Decanoate) 100 Mg/1 Ml Ampul 50 Mg IM [id number]


Invega Sustenna (Paliperidone Palmitate) 234 Mg/1.5 Ml Disp.syrin 234 Mg IM 

QMONTH


I have reviewed the current psychotropics carefully including drug 

interactions.  Risk benefit ratio favors no change other than as noted in my 

dictated progress note.





Diagnosis:


Problems:  


(1) Anxiety disorder


(2) Bipolar affective, mixed, sev w/ psych


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


(5) Dementia, vascular, with delusions


(6) Impulse control disorder


(7) Schizoaffective disorder, chronic condition with acute exacerbation


(8) Schizoaffective disorder











MARYAM HOYT MD May 9, 2018 22:18

## 2018-05-10 VITALS — DIASTOLIC BLOOD PRESSURE: 70 MMHG | SYSTOLIC BLOOD PRESSURE: 121 MMHG

## 2018-05-10 RX ADMIN — TIMOLOL MALEATE SCH DROP: 5 SOLUTION/ DROPS OPHTHALMIC at 09:00

## 2018-05-10 RX ADMIN — Medication SCH MG: at 10:08

## 2018-05-10 RX ADMIN — VALPROIC ACID SCH MG: 250 SOLUTION ORAL at 10:08

## 2018-05-10 RX ADMIN — HALOPERIDOL SCH MG: 5 TABLET ORAL at 10:08

## 2018-05-10 RX ADMIN — OXYBUTYNIN CHLORIDE SCH MG: 5 TABLET ORAL at 10:08

## 2018-05-10 RX ADMIN — LINAGLIPTIN SCH MG: 5 TABLET, FILM COATED ORAL at 06:00

## 2018-05-10 RX ADMIN — POTASSIUM CHLORIDE SCH MEQ: 1500 TABLET, EXTENDED RELEASE ORAL at 10:08

## 2018-05-10 RX ADMIN — BENZTROPINE MESYLATE SCH MG: 1 TABLET ORAL at 10:08

## 2018-05-10 RX ADMIN — GABAPENTIN SCH MG: 300 CAPSULE ORAL at 10:09

## 2018-05-10 NOTE — PN
DATE:  05/08/2018



PSYCHIATRIC PROGRESS NOTES



This is a late entry of 05/08/2018 covers elements not covered in my initial

note of 05/08/2018.  I met with the patient in the evening.  The patient slept

6-1/4 hours.  The patient takes medications in ice cream, was quite agitated

previous evening, slept at night.  Discussed with social service staff on a

couple of occasions and with nursing staff.  She has received her add on dosage

of Haldol Decanoate 50 mg IM and will get this on the 9th to make it a total of

150 mg once a month, which would be equivalent to the oral dosage that it

appears it takes to stabilize her psychotic symptoms.



REVIEW OF SYSTEMS:  No CV, , pulmonary, eye system symptoms on review. 

Reliability poor.



MENTAL STATUS EXAM:  Oriented to herself and situation.  Speech is coherent,

rapid at times.  Abstraction fair, computation impaired, language function

intact.  No suicidal or homicidal ideation.



IMPRESSION:  Unchanged from initial note.



PLAN:  As noted above.





______________________________

MARYAM HOYT MD



DR:  ARIE/kain  JOB#:  [id number] / [id number]

DD:  05/09/2018 16:28  DT:  05/10/2018 02:25

## 2018-05-10 NOTE — PDOC
Exam


Note:


Javier Note:


Please also refer to the separate dictated note~for this date of service 

dictated separately.~Patient seen individually. Discussed the patient with 

Nursing staff reviewed the chart.~Reviewed interim history and current 

functioning. Reviewed vital signs,~Labs/ Radiology~and current medications 

noted below. Continue current treatment with the changes noted in the dictated 

addendum note





Assessment:


Vital Signs:





 Vital Signs








  Date Time  Temp Pulse Resp B/P (MAP) Pulse Ox O2 Delivery O2 Flow Rate FiO2


 


5/10/18 10:09  81  121/70    


 


5/9/18 06:00 97.9  18  96   


 


5/8/18 06:17      Room Air  








I&O











Intake and Output 


 


 5/10/18





 07:00


 


Intake Total 960 ml


 


Balance 960 ml


 


 


 


Intake Oral 960 ml


 


# Voids 1








Labs:





 Laboratory Tests








Test


 5/10/18


08:02


 


Glucose (Fingerstick)


 86 mg/dL


(70-99)











Current Medications:


Meds:





Current Medications


Acetaminophen (Tylenol) 650 mg PRN Q6HRS  PRN PO PAIN / TEMP;  Start 4/25/18 at 

05:00;  Stop 4/26/18 at 16:16;  Status DC


Multi-Ingredient Ointment (Analgesic Balm) 1 edin PRN QID  PRN TP MUSCLE PAIN;  

Start 4/25/18 at 05:00;  Stop 5/10/18 at 12:34;  Status DC


Al Hydroxide/Mg Hydroxide (Mylanta Plus Xs) 15 ml PRN AFTMEALHC  PRN PO 

DYSPEPSIA;  Start 4/25/18 at 05:00;  Stop 4/26/18 at 16:16;  Status DC


Magnesium Hydroxide (Milk Of Magnesia) 2,400 mg PRN QHS  PRN PO CONSTIPATION;  

Start 4/25/18 at 05:00;  Stop 4/26/18 at 16:16;  Status DC


Acetaminophen (Tylenol) 650 mg PRN Q4HRS  PRN PO PAIN / TEMP;  Start 4/25/18 at 

05:30;  Stop 5/10/18 at 12:34;  Status DC


Amlodipine Besylate (Norvasc) 5 mg DAILY PO  Last administered on 5/10/18at 10:

09;  Start 4/25/18 at 09:00;  Stop 5/10/18 at 12:34;  Status DC


Atorvastatin Calcium (Lipitor) 10 mg QHS PO  Last administered on 5/9/18at 19:46

;  Start 4/25/18 at 21:00;  Stop 5/10/18 at 12:34;  Status DC


Bismuth Subsalicylate (Pepto-Bismol) 262 mg PRN Q4HRS  PRN PO DYSPEPSIA;  Start 

4/25/18 at 05:30;  Stop 5/10/18 at 12:34;  Status DC


Ferrous Sulfate (Feosol) 325 mg BIDAFTMEAL PO  Last administered on 5/10/18at 10

:08;  Start 4/25/18 at 09:00;  Stop 5/10/18 at 12:34;  Status DC


Gabapentin (Neurontin) 300 mg DAILY PO  Last administered on 5/10/18at 10:09;  

Start 4/25/18 at 09:00;  Stop 5/10/18 at 12:34;  Status DC


Acetaminophen/ Hydrocodone Bitart (Lortab 5/325) 1 tab TID  PRN PO PAIN Last 

administered on 5/6/18at 20:02;  Start 4/25/18 at 05:30;  Stop 5/10/18 at 12:34

;  Status DC


Al Hydroxide/Mg Hydroxide (Mylanta Plus Xs) 30 ml PRN Q4HRS  PRN PO DYSPEPSIA;  

Start 4/25/18 at 05:30;  Stop 5/10/18 at 12:34;  Status DC


Timolol Maleate (Timoptic 0.5% Ophth) 1 drop DAILY OU  Last administered on 5/7/ 18at 04:58;  Start 4/25/18 at 09:00;  Stop 5/10/18 at 12:34;  Status DC


Vitamin D (Vitamin D3) 50,000 unit WEEKLY PO  Last administered on 5/4/18at 07:

53;  Start 4/27/18 at 09:00;  Stop 5/10/18 at 12:34;  Status DC


Hydrochlorothiazide (Hydrodiuril) 25 mg DAILY PO  Last administered on 5/10/

18at 10:09;  Start 4/25/18 at 09:00;  Stop 5/10/18 at 12:34;  Status DC


Loperamide HCl (Imodium) 2 mg PRN Q2HR  PRN PO DIARRHEA;  Start 4/25/18 at 05:30

;  Stop 5/10/18 at 12:34;  Status DC


Magnesium Hydroxide (Milk Of Magnesia) 2,400 mg PRN DAILY  PRN PO CONSTIPATION;

  Start 4/25/18 at 05:30;  Stop 5/10/18 at 12:34;  Status DC


Metformin HCl (Glucophage) 1,000 mg BIDWMEALS PO  Last administered on 5/10/

18at 10:07;  Start 4/25/18 at 08:00;  Stop 5/10/18 at 12:34;  Status DC


Potassium Chloride (Klor-Con) 10 meq BIDWMEALS PO  Last administered on 5/5/ 18at 07:49;  Start 4/25/18 at 08:00;  Stop 5/5/18 at 15:21;  Status DC


Linagliptin (Tradjenta) 5 mg DAILY06 PO  Last administered on 5/9/18at 06:05;  

Start 4/25/18 at 06:00;  Stop 5/10/18 at 12:34;  Status DC


Non-Formulary Medication (Solifenacin Succinate (Vesicare)) 5 mg DAILY PO ;  

Start 4/25/18 at 09:00;  Stop 4/25/18 at 09:00;  Status DC


Oxybutynin Chloride (Ditropan) 5 mg BID PO  Last administered on 5/10/18at 10:08

;  Start 4/25/18 at 09:00;  Stop 5/10/18 at 12:34;  Status DC


Benztropine Mesylate (Cogentin) 1 mg DAILY PO  Last administered on 5/10/18at 10

:08;  Start 4/25/18 at 11:00;  Stop 5/10/18 at 12:34;  Status DC


Olanzapine (ZyPREXA ZYDIS) 2.5 mg PRN Q2HR  PRN PO PSYCHOSIS Last administered 

on 4/25/18at 11:04;  Start 4/25/18 at 10:45;  Stop 4/25/18 at 11:26;  Status DC


Olanzapine (ZyPREXA ZYDIS) 5 mg PRN Q2HR  PRN PO PSYCHOSIS Last administered on 

5/6/18at 18:31;  Start 4/25/18 at 11:30;  Stop 5/10/18 at 12:34;  Status DC


Lorazepam (Ativan) 0.5 mg PRN Q2HR  PRN PO ANXIETY / AGITATION Last 

administered on 5/1/18at 07:54;  Start 4/25/18 at 11:30;  Stop 5/10/18 at 12:34

;  Status DC


Valproic Acid (Depakene) 500 mg QHS PO ;  Start 4/25/18 at 21:00;  Stop 4/26/18 

at 18:46;  Status DC


Haloperidol Lactate (Haldol) 5 mg DAILY IM  Last administered on 4/27/18at 19:42

;  Start 4/26/18 at 03:15;  Stop 4/28/18 at 05:13;  Status DC


Lorazepam (Ativan) 1 mg DAILY IM  Last administered on 4/27/18at 21:12;  Start 4 /26/18 at 03:15;  Stop 4/29/18 at 18:47;  Status DC


Valproic Acid (Depakene) 500 mg QHS PO  Last administered on 4/27/18at 19:15;  

Start 4/26/18 at 21:00;  Stop 4/28/18 at 15:44;  Status DC


Trazodone HCl (Desyrel) 100 mg QHS PO  Last administered on 5/4/18at 19:31;  

Start 4/27/18 at 21:00;  Stop 5/4/18 at 19:37;  Status DC


Trazodone HCl (Desyrel) 100 mg PRN QHS  PRN PO INSOMNIA;  Start 4/27/18 at 20:00

;  Stop 5/10/18 at 12:34;  Status DC


Haloperidol (Haldol) 10 mg DAILY PO  Last administered on 4/30/18at 10:34;  

Start 4/28/18 at 09:00;  Stop 4/30/18 at 19:17;  Status DC


Valproic Acid (Depakene) 500 mg BID PO  Last administered on 5/10/18at 10:08;  

Start 4/28/18 at 16:00;  Stop 5/10/18 at 12:34;  Status DC


Haloperidol (Haldol) 15 mg DAILY PO  Last administered on 5/7/18at 04:56;  

Start 5/1/18 at 09:00;  Stop 5/7/18 at 10:00;  Status DC


Haloperidol Decanoate (Haldol Decanoate Im Extended Release) 100 mg 1X  ONCE IM

  Last administered on 5/3/18at 15:09;  Start 5/3/18 at 13:30;  Stop 5/3/18 at 

13:34;  Status DC


Haloperidol (Haldol) 10 mg DAILY PO  Last administered on 5/10/18at 10:08;  

Start 5/8/18 at 09:00;  Stop 5/10/18 at 12:34;  Status DC


Trazodone HCl (Desyrel) 150 mg QHS PO  Last administered on 5/4/18at 23:29;  

Start 5/4/18 at 21:00;  Stop 5/5/18 at 18:00;  Status DC


Trazodone HCl (Desyrel) 50 mg 1X  ONCE PO  Last administered on 5/4/18at 21:38;

  Start 5/4/18 at 21:30;  Stop 5/4/18 at 21:31;  Status DC


Potassium Chloride (Klor-Con) 20 meq BIDWMEALS PO  Last administered on 5/10/

18at 10:08;  Start 5/6/18 at 17:00;  Stop 5/10/18 at 12:34;  Status DC


Trazodone HCl (Desyrel) 100 mg QHS PO ;  Start 5/5/18 at 18:15;  Stop 5/5/18 at 

18:15;  Status DC


Trazodone HCl (Desyrel) 100 mg QHS PO  Last administered on 5/9/18at 19:46;  

Start 5/5/18 at 21:00;  Stop 5/10/18 at 12:34;  Status DC


Haloperidol Decanoate (Haldol Decanoate Im Extended Release) 50 mg 1X  ONCE IM  

Last administered on 5/9/18at 12:11;  Start 5/9/18 at 09:00;  Stop 5/9/18 at 09:

01;  Status DC


Haloperidol Decanoate (Haldol Decanoate Im Extended Release) 150 mg QMONTH IM ;

  Start 6/8/18 at 09:00;  Stop 6/8/18 at 09:00;  Status DC





Active Scripts


Active


Reported


Trazodone Hcl 100 Mg Tablet 100 Mg PO PRN QHS PRN


Metformin Hcl 1,000 Mg Tablet 1,000 Mg PO BIDWMEALS


Depakene (Valproate Sodium) 250 Mg/5 Ml Solution 500 Mg PO BID


Klor-Con M20 (Potassium Chloride) 20 Meq Tab.er.prt 20 Meq PO BIDWMEALS


Oxybutynin Chloride 5 Mg Tablet 5 Mg PO BID


Zyprexa (Olanzapine) 5 Mg Tablet 5 Mg PO PRN Q2HR PRN


Analgesic Balm (Methyl Salicylate/Menthol) 28 Gm Oint...g. 1 Gm TP PRN QID PRN


Lorazepam 0.5 Mg Tablet 0.5 Mg PO PRN Q2HR PRN


Haloperidol 10 Mg Tablet 10 Mg PO DAILY


Haldol Decanoate 100 (Haloperidol Decanoate) 100 Mg/1 Ml Ampul 150 Mg IM QMONTH


Ferrous Sulfate 325 Mg Tablet 325 Mg PO BIDAFTMEAL


Vitamin D3 (Cholecalciferol (Vitamin D3)) 50,000 Unit Capsule 50,000 Unit PO 

WEEKLY


Benztropine Mesylate 1 Mg Tablet 1 Mg PO DAILY


Atorvastatin Calcium 10 Mg Tablet 10 Mg PO QHS


Amlodipine Besylate 5 Mg Tablet 5 Mg PO DAILY


Januvia (Sitagliptin Phosphate) 100 Mg Tablet 100 Mg PO DAILY06


Hydrocodone-Apap 5-325  ** (Hydrocodone Bit/Acetaminophen) 1 Each Tablet 1 Tab 

PO TID PRN


Hydrochlorothiazide Tablet (Hydrochlorothiazide) 12.5 Mg Tablet 25 Mg PO DAILY


Gabapentin 300 Mg Capsule 300 Mg PO DAILY


Timoptic (Timolol Maleate) 10 Ml Drops 1 Ml OU DAILY


Milk Of Magnesia (Magnesium Hydroxide) 2,400 Mg/10 Ml Oral.susp 2,400 Mg PO PRN 

DAILY PRN


Maalox Maximum Strength Susp (Mag Hydrox/Al Hydrox/Simeth) 355 Ml Oral.susp 30 

Ml PO PRN Q4HRS PRN


Loperamide (Loperamide Hcl) 2 Mg Tablet 2 Mg PO PRN Q2HR PRN


Bismatrol (Bismuth Subsalicylate) 262 Mg/15 Ml Oral.susp 262 Mg PO PRN Q4HRS PRN


Tylenol (Acetaminophen) 325 Mg Tablet 650 Mg PO PRN Q4HRS PRN


Trazodone Hcl 100 Mg Tablet 100 Mg PO QHS


I have reviewed the current psychotropics carefully including drug 

interactions.  Risk benefit ratio favors no change other than as noted in my 

dictated progress note.





Diagnosis:


Problems:  


(1) Schizoaffective disorder, chronic condition with acute exacerbation


(2) Impulse control disorder


(3) Dementia, vascular, with delusions


(4) Bipolar affective, mixed, sev w/ psych











MARYAM HOYT MD May 10, 2018 18:09

## 2018-05-11 NOTE — DS
DATE OF DISCHARGE:  05/10/2018



DISCHARGE SUMMARY/PSYCHIATRIC PROGRESS NOTE



REASON FOR ADMISSION:  Please refer to the admission history for details. 

Briefly, the patient is a 63-year-old -American female referred from

[facility] where she presented from [facility] on

account of combative behavior, throwing tray at other patients, refusing

medications, increasingly psychotic within the context of her schizoaffective

disorder, bipolar type, mixed with psychotic features.  She had failed

outpatient psychiatric interventions.



SIGNIFICANT FINDINGS AND CLINICAL COURSE:  Following admission, the patient was

seen daily individually by myself from a psychiatric standpoint, medical

followup with Dr. Lewis/Dr. Walker.  The patient was quite psychotic, labile,

agitated with marked pressure of speech.  Adjustments were made in her

psychotropics and she was started on Haldol Decanoate, which was adjusted to 150

mg IM q. monthly as an equivalent dosage of what it took the oral Haldol dosage

to help control her symptoms.  She was also on Cogentin 1 mg daily, Zyprexa

p.r.n., Depakene 500 mg b.i.d. with valproic acid level therapeutic at 77,

Ativan p.r.n., Haldol 10 mg orally daily, and this should be tapered starting

about 30 days from discharge until it is discontinued as the Depo preparation of

the Haldol should suffice for her.  She was also on trazodone 150 mg at bedtime

and 100 mg at bedtime p.r.n. insomnia.



REVIEW OF SYSTEMS:  Prior to discharge on 05/10/2018, no CV, , pulmonary, eye,

ENT system symptoms on review.



MENTAL STATUS EXAM:  Oriented to herself.  Insight, judgment, recent and remote

memory, attention, concentration, fund of knowledge poor, consistent with her

diagnosis mentioned in my initial note.



IMPRESSION:  Schizoaffective disorder, bipolar type, mixed with psychotic

features; cognitive disorder, unspecified; anxiety disorder, unspecified;

impulse control disorder, unspecified.  Rest unchanged from admission.



DISCHARGE MEDICATIONS:  Please refer to the EMRAD. 



DISCHARGE INSTRUCTIONS:  Outpatient psychiatric and medical followup at the

nursing home.



Time for discharge day management greater than 30 minutes.





______________________________

MARYAM HOYT MD



DR:  ARIE/kain  JOB#:  [id number] / [id number]

DD:  05/11/2018 09:07  DT:  05/11/2018 09:40

## 2018-05-12 NOTE — PN
DATE:  05/09/2018



PSYCHIATRIC PROGRESS NOTE





This late entry 05/09/2018 covers elements not covered in my initial note of

05/09/2018.





SUBJECTIVE:  Met with the patient in the evening.  The patient slept 4-3/4

hours, refused meds in ice cream, refused breakfast and lunch.  Oral Haldol will

be tapered starting 05/20/2018 because she is on Haldol Decanoate 150 mg

monthly.





REVIEW OF SYSTEMS:  No CV, , pulmonary, eye, ENT system symptoms on review. 

Reliability is poor.





MENTAL STATUS EXAM:  Oriented to herself.  Insight, judgment, recent memory is

impaired.  Language function is intact.  Mood and affect is labile.  No clear

suicidal or homicidal ideation, still somewhat paranoid.





LABORATORY DATA:  Reviewed.





IMPRESSION:  Unchanged from initial note.





PLAN:  As noted above.





______________________________

MAN CURTIS HOYT MD



DR:  ARIE/kain  JOB#:  [id number] / [id number]

DD:  05/11/2018 17:00  DT:  05/12/2018 12:12

## 2023-04-28 NOTE — PDOC
Exam


Note:


Javier Note:


Please also refer to the separate dictated note~for this date of service 

dictated separately.~Patient seen individually. Discussed the patient with 

Nursing staff reviewed the chart.~Reviewed interim history and current 

functioning. Reviewed vital signs,~Labs/ Radiology~and current medications 

noted below. Continue current treatment with the changes noted in the dictated 

addendum note





Assessment:


Vital Signs:





 Vital Signs








  Date Time  Temp Pulse Resp B/P (MAP) Pulse Ox O2 Delivery O2 Flow Rate FiO2


 


5/3/18 10:36  73  125/59    


 


5/3/18 05:40 97.2  18  91   


 


5/2/18 13:00      Room Air  








I&O











Intake and Output 


 


 5/3/18





 07:00


 


Intake Total 1080 ml


 


Balance 1080 ml


 


 


 


Intake Oral 1080 ml











Current Medications:


Meds:





Current Medications


Acetaminophen (Tylenol) 650 mg PRN Q6HRS  PRN PO PAIN / TEMP;  Start 4/25/18 at 

05:00;  Stop 4/26/18 at 16:16;  Status DC


Multi-Ingredient Ointment (Analgesic Balm) 1 edin PRN QID  PRN TP MUSCLE PAIN;  

Start 4/25/18 at 05:00


Al Hydroxide/Mg Hydroxide (Mylanta Plus Xs) 15 ml PRN AFTMEALHC  PRN PO 

DYSPEPSIA;  Start 4/25/18 at 05:00;  Stop 4/26/18 at 16:16;  Status DC


Magnesium Hydroxide (Milk Of Magnesia) 2,400 mg PRN QHS  PRN PO CONSTIPATION;  

Start 4/25/18 at 05:00;  Stop 4/26/18 at 16:16;  Status DC


Acetaminophen (Tylenol) 650 mg PRN Q4HRS  PRN PO PAIN / TEMP;  Start 4/25/18 at 

05:30


Amlodipine Besylate (Norvasc) 5 mg DAILY PO  Last administered on 5/3/18at 10:36

;  Start 4/25/18 at 09:00


Atorvastatin Calcium (Lipitor) 10 mg QHS PO  Last administered on 5/3/18at 20:05

;  Start 4/25/18 at 21:00


Bismuth Subsalicylate (Pepto-Bismol) 262 mg PRN Q4HRS  PRN PO DYSPEPSIA;  Start 

4/25/18 at 05:30


Ferrous Sulfate (Feosol) 325 mg BIDAFTMEAL PO  Last administered on 5/3/18at 16:

20;  Start 4/25/18 at 09:00


Gabapentin (Neurontin) 300 mg DAILY PO  Last administered on 5/3/18at 10:34;  

Start 4/25/18 at 09:00


Acetaminophen/ Hydrocodone Bitart (Lortab 5/325) 1 tab TID  PRN PO PAIN Last 

administered on 5/2/18at 11:03;  Start 4/25/18 at 05:30


Al Hydroxide/Mg Hydroxide (Mylanta Plus Xs) 30 ml PRN Q4HRS  PRN PO DYSPEPSIA;  

Start 4/25/18 at 05:30


Timolol Maleate (Timoptic 0.5% Ophth) 1 drop DAILY OU  Last administered on 4/26 /18at 10:58;  Start 4/25/18 at 09:00


Vitamin D (Vitamin D3) 50,000 unit WEEKLY PO  Last administered on 4/27/18at 18:

14;  Start 4/27/18 at 09:00


Hydrochlorothiazide (Hydrodiuril) 25 mg DAILY PO  Last administered on 5/3/18at 

10:35;  Start 4/25/18 at 09:00


Loperamide HCl (Imodium) 2 mg PRN Q2HR  PRN PO DIARRHEA;  Start 4/25/18 at 05:30


Magnesium Hydroxide (Milk Of Magnesia) 2,400 mg PRN DAILY  PRN PO CONSTIPATION;

  Start 4/25/18 at 05:30


Metformin HCl (Glucophage) 1,000 mg BIDWMEALS PO  Last administered on 5/3/18at 

16:20;  Start 4/25/18 at 08:00


Potassium Chloride (Klor-Con) 10 meq BIDWMEALS PO  Last administered on 5/3/

18at 16:20;  Start 4/25/18 at 08:00


Linagliptin (Tradjenta) 5 mg DAILY06 PO  Last administered on 5/3/18at 05:44;  

Start 4/25/18 at 06:00


Non-Formulary Medication (Solifenacin Succinate (Vesicare)) 5 mg DAILY PO ;  

Start 4/25/18 at 09:00;  Stop 4/25/18 at 09:00;  Status DC


Oxybutynin Chloride (Ditropan) 5 mg BID PO  Last administered on 5/3/18at 20:05

;  Start 4/25/18 at 09:00


Benztropine Mesylate (Cogentin) 1 mg DAILY PO  Last administered on 5/3/18at 10:

36;  Start 4/25/18 at 11:00


Olanzapine (ZyPREXA ZYDIS) 2.5 mg PRN Q2HR  PRN PO PSYCHOSIS Last administered 

on 4/25/18at 11:04;  Start 4/25/18 at 10:45;  Stop 4/25/18 at 11:26;  Status DC


Olanzapine (ZyPREXA ZYDIS) 5 mg PRN Q2HR  PRN PO PSYCHOSIS Last administered on 

5/1/18at 18:36;  Start 4/25/18 at 11:30


Lorazepam (Ativan) 0.5 mg PRN Q2HR  PRN PO ANXIETY / AGITATION Last 

administered on 5/1/18at 07:54;  Start 4/25/18 at 11:30


Valproic Acid (Depakene) 500 mg QHS PO ;  Start 4/25/18 at 21:00;  Stop 4/26/18 

at 18:46;  Status DC


Haloperidol Lactate (Haldol) 5 mg DAILY IM  Last administered on 4/27/18at 19:42

;  Start 4/26/18 at 03:15;  Stop 4/28/18 at 05:13;  Status DC


Lorazepam (Ativan) 1 mg DAILY IM  Last administered on 4/27/18at 21:12;  Start 4 /26/18 at 03:15;  Stop 4/29/18 at 18:47;  Status DC


Valproic Acid (Depakene) 500 mg QHS PO  Last administered on 4/27/18at 19:15;  

Start 4/26/18 at 21:00;  Stop 4/28/18 at 15:44;  Status DC


Trazodone HCl (Desyrel) 100 mg QHS PO  Last administered on 5/3/18at 20:05;  

Start 4/27/18 at 21:00


Trazodone HCl (Desyrel) 100 mg PRN QHS  PRN PO INSOMNIA;  Start 4/27/18 at 20:00


Haloperidol (Haldol) 10 mg DAILY PO  Last administered on 4/30/18at 10:34;  

Start 4/28/18 at 09:00;  Stop 4/30/18 at 19:17;  Status DC


Valproic Acid (Depakene) 500 mg BID PO  Last administered on 5/3/18at 20:05;  

Start 4/28/18 at 16:00


Haloperidol (Haldol) 15 mg DAILY PO  Last administered on 5/3/18at 10:38;  

Start 5/1/18 at 09:00;  Stop 5/7/18 at 10:00


Haloperidol Decanoate (Haldol Decanoate Im Extended Release) 100 mg 1X  ONCE IM

  Last administered on 5/3/18at 15:09;  Start 5/3/18 at 13:30;  Stop 5/3/18 at 

13:34;  Status DC


Haloperidol (Haldol) 10 mg DAILY PO ;  Start 5/8/18 at 09:00





Active Scripts


Active


Reported


Benztropine Mesylate 1 Mg Tablet 1 Mg PO DAILY


Atorvastatin Calcium 10 Mg Tablet 10 Mg PO QHS


Amlodipine Besylate 5 Mg Tablet 5 Mg PO DAILY


Januvia (Sitagliptin Phosphate) 100 Mg Tablet 100 Mg PO DAILY06


Potassium Chloride 10 Meq Tablet.er 10 Meq PO BID


Metformin Hcl 1,000 Mg Tablet 1,000 Mg PO BID


Hydrocodone-Apap 5-325  ** (Hydrocodone Bit/Acetaminophen) 1 Each Tablet 1 Tab 

PO TID PRN


Hydrochlorothiazide Tablet (Hydrochlorothiazide) 12.5 Mg Tablet 25 Mg PO DAILY


Gabapentin 300 Mg Capsule 300 Mg PO DAILY


Ferrous Sulfate 325 Mg Tablet 325 Mg PO BID


Vitamin D2 (Ergocalciferol (Vitamin D2)) 50,000 Unit Capsule 50,000 Unit PO QFR


Vesicare (Solifenacin Succinate) 5 Mg Tablet 5 Mg PO DAILY


Timoptic (Timolol Maleate) 10 Ml Drops 1 Ml OU DAILY


Milk Of Magnesia (Magnesium Hydroxide) 2,400 Mg/10 Ml Oral.susp 2,400 Mg PO PRN 

DAILY PRN


Maalox Maximum Strength Susp (Mag Hydrox/Al Hydrox/Simeth) 355 Ml Oral.susp 30 

Ml PO PRN Q4HRS PRN


Loperamide (Loperamide Hcl) 2 Mg Tablet 2 Mg PO PRN Q2HR PRN


Bismatrol (Bismuth Subsalicylate) 262 Mg/15 Ml Oral.susp 262 Mg PO PRN Q4HRS PRN


Tylenol (Acetaminophen) 325 Mg Tablet 650 Mg PO PRN Q4HRS PRN


Zyprexa Zydis (Olanzapine) 5 Mg Tab.rapdis 2.5 Mg PO PRN Q2HR PRN


Haldol Decanoate 100 (Haloperidol Decanoate) 100 Mg/1 Ml Ampul 50 Mg IM [id number]


Invega Sustenna (Paliperidone Palmitate) 234 Mg/1.5 Ml Disp.syrin 234 Mg IM 

QMONTH


I have reviewed the current psychotropics carefully including drug 

interactions.  Risk benefit ratio favors no change other than as noted in my 

dictated progress note.





Diagnosis:


Problems:  


(1) Anxiety disorder


(2) Bipolar affective, mixed, sev w/ psych


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


(5) Dementia, vascular, with delusions


(6) Impulse control disorder


(7) Schizoaffective disorder, chronic condition with acute exacerbation


(8) Schizoaffective disorder











MARYAM HOYT MD May 3, 2018 21:02 [Use of Plain Language] : use of plain language [Adequate] : adequate [None] : none [] : I have reviewed management goals with caretaker and provided a copy of care plan